# Patient Record
Sex: MALE | Race: WHITE | NOT HISPANIC OR LATINO | Employment: FULL TIME | URBAN - NONMETROPOLITAN AREA
[De-identification: names, ages, dates, MRNs, and addresses within clinical notes are randomized per-mention and may not be internally consistent; named-entity substitution may affect disease eponyms.]

---

## 2017-12-26 ENCOUNTER — OFFICE VISIT (OUTPATIENT)
Dept: FAMILY MEDICINE CLINIC | Facility: CLINIC | Age: 37
End: 2017-12-26

## 2017-12-26 ENCOUNTER — TELEPHONE (OUTPATIENT)
Dept: FAMILY MEDICINE CLINIC | Facility: CLINIC | Age: 37
End: 2017-12-26

## 2017-12-26 VITALS
BODY MASS INDEX: 20.92 KG/M2 | TEMPERATURE: 97.7 F | WEIGHT: 138 LBS | DIASTOLIC BLOOD PRESSURE: 68 MMHG | HEART RATE: 53 BPM | SYSTOLIC BLOOD PRESSURE: 120 MMHG | OXYGEN SATURATION: 99 % | HEIGHT: 68 IN

## 2017-12-26 DIAGNOSIS — H61.22 IMPACTED CERUMEN OF LEFT EAR: ICD-10-CM

## 2017-12-26 DIAGNOSIS — J02.9 ACUTE PHARYNGITIS, UNSPECIFIED ETIOLOGY: Primary | ICD-10-CM

## 2017-12-26 PROCEDURE — 96372 THER/PROPH/DIAG INJ SC/IM: CPT | Performed by: NURSE PRACTITIONER

## 2017-12-26 PROCEDURE — 99213 OFFICE O/P EST LOW 20 MIN: CPT | Performed by: NURSE PRACTITIONER

## 2017-12-26 PROCEDURE — 69210 REMOVE IMPACTED EAR WAX UNI: CPT | Performed by: NURSE PRACTITIONER

## 2017-12-26 RX ORDER — AZITHROMYCIN 250 MG/1
TABLET, FILM COATED ORAL
Qty: 6 TABLET | Refills: 0 | Status: SHIPPED | OUTPATIENT
Start: 2017-12-26 | End: 2017-12-26 | Stop reason: SDUPTHER

## 2017-12-26 RX ORDER — AZITHROMYCIN 250 MG/1
TABLET, FILM COATED ORAL
Qty: 6 TABLET | Refills: 0 | Status: SHIPPED | OUTPATIENT
Start: 2017-12-26 | End: 2018-01-02

## 2017-12-26 RX ORDER — METHYLPREDNISOLONE ACETATE 80 MG/ML
40 INJECTION, SUSPENSION INTRA-ARTICULAR; INTRALESIONAL; INTRAMUSCULAR; SOFT TISSUE ONCE
Status: COMPLETED | OUTPATIENT
Start: 2017-12-26 | End: 2017-12-26

## 2017-12-26 RX ORDER — ASPIRIN 81 MG/1
81 TABLET, CHEWABLE ORAL DAILY
COMMUNITY

## 2017-12-26 RX ADMIN — METHYLPREDNISOLONE ACETATE 40 MG: 80 INJECTION, SUSPENSION INTRA-ARTICULAR; INTRALESIONAL; INTRAMUSCULAR; SOFT TISSUE at 14:22

## 2017-12-26 NOTE — TELEPHONE ENCOUNTER
Patient wife requesting his medicines be resent to [facility]. Pharmacy they normally use is closed until after the first of the year.

## 2017-12-26 NOTE — PROGRESS NOTES
Subjective   Alepsh Small is a 37 y.o. male who presents to the office complaining of scratchy throat and ear pain, Left worse than Right that started last night.  Has occasional allergies but says that he usually does not take medications for them.  Denies chills.  Takes Metoprolol for SVT episodes in the past.    History of Present Illness     The following portions of the patient's history were reviewed and updated as appropriate: allergies, current medications, past family history, past medical history, past social history, past surgical history and problem list.    Review of Systems   Constitutional: Negative for chills, fatigue and fever.   HENT: Positive for sore throat. Negative for congestion, sneezing and trouble swallowing.    Eyes: Negative for visual disturbance.   Respiratory: Negative for cough, chest tightness, shortness of breath and wheezing.    Cardiovascular: Negative for chest pain, palpitations and leg swelling.   Gastrointestinal: Negative for abdominal pain, constipation, diarrhea, nausea and vomiting.   Genitourinary: Negative for dysuria, frequency and urgency.   Musculoskeletal: Negative for neck pain.   Skin: Negative for rash.   Neurological: Negative for dizziness, weakness and headaches.   Psychiatric/Behavioral:        In the past two weeks the pt has not felt down, depressed, hopeless or lost interest in doing things   All other systems reviewed and are negative.      Objective   Physical Exam   Constitutional: He is oriented to person, place, and time. He appears well-developed and well-nourished. He is cooperative.  Non-toxic appearance. He does not have a sickly appearance. He does not appear ill.   HENT:   Head: Normocephalic and atraumatic.   Right Ear: Tympanic membrane and external ear normal.   Left Ear: Tympanic membrane and external ear normal.   Nose: Nose normal.   Mouth/Throat: Uvula is midline and mucous membranes are normal. Posterior oropharyngeal erythema  present.   Left TM required instrumentation lavage due to impaction; enough cerumen removed to see nml TM   Eyes: Conjunctivae, EOM and lids are normal. Pupils are equal, round, and reactive to light. Right eye exhibits no discharge. Left eye exhibits no discharge. No scleral icterus.   Neck: Trachea normal, normal range of motion and phonation normal. Neck supple. No thyromegaly present.   Cardiovascular: Normal rate, regular rhythm, normal heart sounds and intact distal pulses.  Exam reveals no gallop and no friction rub.    No murmur heard.  Pulmonary/Chest: Effort normal and breath sounds normal. No respiratory distress. He has no wheezes. He has no rales.   Abdominal: Soft. Normal appearance and bowel sounds are normal. He exhibits no distension. There is no tenderness. There is no rebound and no guarding.   Musculoskeletal: Normal range of motion. He exhibits no edema.   Lymphadenopathy:     He has no cervical adenopathy.   Neurological: He is alert and oriented to person, place, and time. No cranial nerve deficit. GCS eye subscore is 4. GCS verbal subscore is 5. GCS motor subscore is 6.   Skin: Skin is warm, dry and intact. No rash noted.   Psychiatric: He has a normal mood and affect. His behavior is normal. Judgment and thought content normal.   Nursing note and vitals reviewed.      Assessment/Plan     Alpesh was seen today for sore throat.    Diagnoses and all orders for this visit:    Acute pharyngitis, unspecified etiology  -     methylPREDNISolone acetate (DEPO-medrol) injection 40 mg; Inject 0.5 mL into the shoulder, thigh, or buttocks 1 (One) Time.    Impacted cerumen of left ear    Other orders  -     Discontinue: azithromycin (ZITHROMAX) 250 MG tablet; Take 2 tablets the first day, then 1 tablet daily for 4 days.    Encouraged warm salt water gargles, fluid maintenance and good handwashing.

## 2018-01-02 ENCOUNTER — OFFICE VISIT (OUTPATIENT)
Dept: FAMILY MEDICINE CLINIC | Facility: CLINIC | Age: 38
End: 2018-01-02

## 2018-01-02 VITALS
HEIGHT: 68 IN | WEIGHT: 137 LBS | HEART RATE: 73 BPM | SYSTOLIC BLOOD PRESSURE: 110 MMHG | TEMPERATURE: 99.7 F | DIASTOLIC BLOOD PRESSURE: 68 MMHG | OXYGEN SATURATION: 98 % | BODY MASS INDEX: 20.76 KG/M2

## 2018-01-02 DIAGNOSIS — R53.83 FATIGUE, UNSPECIFIED TYPE: ICD-10-CM

## 2018-01-02 DIAGNOSIS — R50.9 FEVER, UNSPECIFIED FEVER CAUSE: Primary | ICD-10-CM

## 2018-01-02 DIAGNOSIS — R52 BODY ACHES: ICD-10-CM

## 2018-01-02 DIAGNOSIS — R05.9 COUGH: ICD-10-CM

## 2018-01-02 LAB
ALBUMIN SERPL-MCNC: 4.6 G/DL (ref 3.2–5.5)
ALBUMIN/GLOB SERPL: 1.4 G/DL (ref 1–3)
ALP SERPL-CCNC: 132 U/L (ref 15–121)
ALT SERPL W P-5'-P-CCNC: 17 U/L (ref 10–60)
ANION GAP SERPL CALCULATED.3IONS-SCNC: 11 MMOL/L (ref 5–15)
AST SERPL-CCNC: 20 U/L (ref 10–60)
BASOPHILS # BLD AUTO: 0.04 10*3/MM3 (ref 0–0.2)
BASOPHILS NFR BLD AUTO: 0.5 % (ref 0–2)
BILIRUB SERPL-MCNC: 0.6 MG/DL (ref 0.2–1)
BILIRUB UR QL STRIP: NEGATIVE
BUN BLD-MCNC: 17 MG/DL (ref 8–25)
BUN/CREAT SERPL: 17 (ref 7–25)
CALCIUM SPEC-SCNC: 8.8 MG/DL (ref 8.4–10.8)
CHLORIDE SERPL-SCNC: 100 MMOL/L (ref 100–112)
CLARITY UR: CLEAR
CO2 SERPL-SCNC: 29 MMOL/L (ref 20–32)
COLOR UR: YELLOW
CREAT BLD-MCNC: 1 MG/DL (ref 0.4–1.3)
DEPRECATED RDW RBC AUTO: 45 FL (ref 35.1–43.9)
EOSINOPHIL # BLD AUTO: 0.14 10*3/MM3 (ref 0–0.7)
EOSINOPHIL NFR BLD AUTO: 1.9 % (ref 0–7)
ERYTHROCYTE [DISTWIDTH] IN BLOOD BY AUTOMATED COUNT: 14.1 % (ref 11.5–14.5)
GFR SERPL CREATININE-BSD FRML MDRD: 84 ML/MIN/1.73 (ref 70–162)
GLOBULIN UR ELPH-MCNC: 3.3 GM/DL (ref 2.5–4.6)
GLUCOSE BLD-MCNC: 108 MG/DL (ref 70–100)
GLUCOSE UR STRIP-MCNC: NEGATIVE MG/DL
HCT VFR BLD AUTO: 47.8 % (ref 39–49)
HGB BLD-MCNC: 15.9 G/DL (ref 13.7–17.3)
HGB UR QL STRIP.AUTO: NEGATIVE
KETONES UR QL STRIP: NEGATIVE
LEUKOCYTE ESTERASE UR QL STRIP.AUTO: NEGATIVE
LYMPHOCYTES # BLD AUTO: 0.8 10*3/MM3 (ref 0.6–4.2)
LYMPHOCYTES NFR BLD AUTO: 10.6 % (ref 10–50)
MCH RBC QN AUTO: 29.4 PG (ref 26.5–34)
MCHC RBC AUTO-ENTMCNC: 33.3 G/DL (ref 31.5–36.3)
MCV RBC AUTO: 88.4 FL (ref 80–98)
MONOCYTES # BLD AUTO: 1.12 10*3/MM3 (ref 0–0.9)
MONOCYTES NFR BLD AUTO: 14.8 % (ref 0–12)
NEUTROPHILS # BLD AUTO: 5.46 10*3/MM3 (ref 2–8.6)
NEUTROPHILS NFR BLD AUTO: 72.2 % (ref 37–80)
NITRITE UR QL STRIP: NEGATIVE
PH UR STRIP.AUTO: 6 [PH] (ref 5.5–8)
PLATELET # BLD AUTO: 236 10*3/MM3 (ref 150–450)
PMV BLD AUTO: 9.7 FL (ref 8–12)
POTASSIUM BLD-SCNC: 3.9 MMOL/L (ref 3.4–5.4)
PROT SERPL-MCNC: 7.9 G/DL (ref 6.7–8.2)
PROT UR QL STRIP: NEGATIVE
RBC # BLD AUTO: 5.41 10*6/MM3 (ref 4.37–5.74)
SODIUM BLD-SCNC: 140 MMOL/L (ref 134–146)
SP GR UR STRIP: 1.02 (ref 1–1.03)
UROBILINOGEN UR QL STRIP: NORMAL
WBC NRBC COR # BLD: 7.56 10*3/MM3 (ref 3.2–9.8)

## 2018-01-02 PROCEDURE — 80053 COMPREHEN METABOLIC PANEL: CPT | Performed by: NURSE PRACTITIONER

## 2018-01-02 PROCEDURE — 84443 ASSAY THYROID STIM HORMONE: CPT | Performed by: NURSE PRACTITIONER

## 2018-01-02 PROCEDURE — 36415 COLL VENOUS BLD VENIPUNCTURE: CPT | Performed by: NURSE PRACTITIONER

## 2018-01-02 PROCEDURE — 99214 OFFICE O/P EST MOD 30 MIN: CPT | Performed by: NURSE PRACTITIONER

## 2018-01-02 PROCEDURE — 86618 LYME DISEASE ANTIBODY: CPT | Performed by: NURSE PRACTITIONER

## 2018-01-02 PROCEDURE — 84439 ASSAY OF FREE THYROXINE: CPT | Performed by: NURSE PRACTITIONER

## 2018-01-02 PROCEDURE — 82607 VITAMIN B-12: CPT | Performed by: NURSE PRACTITIONER

## 2018-01-02 PROCEDURE — 81003 URINALYSIS AUTO W/O SCOPE: CPT | Performed by: NURSE PRACTITIONER

## 2018-01-02 PROCEDURE — 86666 EHRLICHIA ANTIBODY: CPT | Performed by: NURSE PRACTITIONER

## 2018-01-02 PROCEDURE — 85025 COMPLETE CBC W/AUTO DIFF WBC: CPT | Performed by: NURSE PRACTITIONER

## 2018-01-03 LAB
T4 FREE SERPL-MCNC: 0.76 NG/DL (ref 0.78–2.19)
TSH SERPL DL<=0.05 MIU/L-ACNC: 0.85 MIU/ML (ref 0.46–4.68)
VIT B12 BLD-MCNC: 519 PG/ML (ref 239–931)

## 2018-01-03 NOTE — PROGRESS NOTES
"Chief Complaint   Patient presents with   • Generalized Body Aches     x 2 months  (off and on)   • Cough     x 2 days, unproductive    • feel sluggish       Subjective   Alpesh Small is a 37 y.o. male presents to the office for evaluation of generalized body aches x 3 months and cough X 2 days.    [facility]  Myocardial Infarction- 12/2016    SVT- managed with metoprolol     HPI   Patient presents with c/o generalized body aches X 3 months. He states \"my bones hurt\".  He has taken 800 mg IBU with good relief for about 4-6 hours, but as soon as the IBU wears off, his pain returns. He has had a low grade fever for an unknown amount of time. He denies N/V/D. Associated symptoms include weakness and fatigue.     No known history of tick bite. No new medications. No know sick contacts      Fatigue   This is a new problem. The current episode started more than 1 month ago. The problem occurs constantly. Associated symptoms include arthralgias, fatigue, a fever, headaches, myalgias and vertigo. Pertinent negatives include no abdominal pain, chest pain, chills, congestion, coughing, nausea, numbness, rash, sore throat, vomiting or weakness. Nothing aggravates the symptoms. He has tried rest for the symptoms. The treatment provided no relief.     Family History   Problem Relation Age of Onset   • Cancer Father      Social History     Social History   • Marital status: Single     Spouse name: N/A   • Number of children: N/A   • Years of education: N/A     Occupational History   • Not on file.     Social History Main Topics   • Smoking status: Current Every Day Smoker     Types: Electronic Cigarette   • Smokeless tobacco: Never Used   • Alcohol use No      Comment: occasionally   • Drug use: No   • Sexual activity: Defer     Other Topics Concern   • Not on file     Social History Narrative   • No narrative on file       The following portions of the patient's history were reviewed and updated as appropriate: allergies, current " "medications, past family history, past medical history, past social history, past surgical history and problem list.    Review of Systems   Constitutional: Positive for fatigue and fever. Negative for activity change, appetite change, chills and unexpected weight change.   HENT: Negative for congestion, drooling, facial swelling, postnasal drip, rhinorrhea, sinus pressure, sore throat, trouble swallowing and voice change.    Eyes: Negative.  Negative for photophobia, pain, discharge and visual disturbance.   Respiratory: Negative.  Negative for apnea, cough, choking and wheezing.    Cardiovascular: Negative.  Negative for chest pain, palpitations and leg swelling.   Gastrointestinal: Negative.  Negative for abdominal distention, abdominal pain, constipation, diarrhea, nausea and vomiting.   Endocrine: Negative.  Negative for polyphagia and polyuria.   Genitourinary: Negative.  Negative for decreased urine volume, difficulty urinating and dysuria.   Musculoskeletal: Positive for arthralgias and myalgias. Negative for gait problem.   Skin: Negative.  Negative for rash and wound.   Allergic/Immunologic: Negative.    Neurological: Positive for vertigo and headaches. Negative for dizziness, syncope, weakness, light-headedness and numbness.   Hematological: Negative.    Psychiatric/Behavioral: Negative.  Negative for confusion, decreased concentration and sleep disturbance. The patient is not nervous/anxious.      14 Point ROS completed with pertinent positives discussed. All other systems reviewed and are negative.       Objective   Encounter Vitals  /68  Pulse 73  Temp 99.7 °F (37.6 °C) (Tympanic)   Ht 172.7 cm (68\")  Wt 62.1 kg (137 lb)  SpO2 98%  BMI 20.83 kg/m2    Physical Exam   Constitutional: He is oriented to person, place, and time. Vital signs are normal. He appears well-developed and well-nourished.   HENT:   Head: Normocephalic and atraumatic.   Right Ear: Tympanic membrane, external ear and ear " canal normal.   Left Ear: Tympanic membrane, external ear and ear canal normal.   Nose: Nose normal.   Mouth/Throat: Uvula is midline, oropharynx is clear and moist and mucous membranes are normal. No posterior oropharyngeal edema or posterior oropharyngeal erythema.   Eyes: Lids are normal. Pupils are equal, round, and reactive to light.   Neck: Trachea normal and normal range of motion. Neck supple. No thyroid mass present.   Cardiovascular: Normal rate, regular rhythm, S1 normal, S2 normal, normal heart sounds and intact distal pulses.  Exam reveals no gallop and no friction rub.    No murmur heard.  Pulmonary/Chest: Effort normal and breath sounds normal. No respiratory distress. He has no wheezes. He has no rales.   Abdominal: Soft. Normal appearance and bowel sounds are normal. He exhibits no mass. There is no rebound and no guarding.   Musculoskeletal: Normal range of motion.   Generalized body aches   Lymphadenopathy:     He has no cervical adenopathy.   Neurological: He is alert and oriented to person, place, and time. He has normal strength. No cranial nerve deficit or sensory deficit. Gait normal.   Skin: Skin is warm and dry. No rash noted.   Psychiatric: He has a normal mood and affect. His speech is normal and behavior is normal. Judgment and thought content normal. Cognition and memory are normal.   Nursing note and vitals reviewed.      Pertinent Labs  Office Visit on 01/02/2018   Component Date Value Ref Range Status   • Glucose 01/02/2018 108* 70 - 100 mg/dL Final   • BUN 01/02/2018 17  8 - 25 mg/dL Final   • Creatinine 01/02/2018 1.00  0.40 - 1.30 mg/dL Final   • Sodium 01/02/2018 140  134 - 146 mmol/L Final   • Potassium 01/02/2018 3.9  3.4 - 5.4 mmol/L Final   • Chloride 01/02/2018 100  100 - 112 mmol/L Final   • CO2 01/02/2018 29.0  20.0 - 32.0 mmol/L Final   • Calcium 01/02/2018 8.8  8.4 - 10.8 mg/dL Final   • Total Protein 01/02/2018 7.9  6.7 - 8.2 g/dL Final   • Albumin 01/02/2018 4.60  3.20  - 5.50 g/dL Final   • ALT (SGPT) 01/02/2018 17  10 - 60 U/L Final   • AST (SGOT) 01/02/2018 20  10 - 60 U/L Final   • Alkaline Phosphatase 01/02/2018 132* 15 - 121 U/L Final   • Total Bilirubin 01/02/2018 0.6  0.2 - 1.0 mg/dL Final   • eGFR Non African Amer 01/02/2018 84  70 - 162 mL/min/1.73 Final   • Globulin 01/02/2018 3.3  2.5 - 4.6 gm/dL Final   • A/G Ratio 01/02/2018 1.4  1.0 - 3.0 g/dL Final   • BUN/Creatinine Ratio 01/02/2018 17.0  7.0 - 25.0 Final   • Anion Gap 01/02/2018 11.0  5.0 - 15.0 mmol/L Final   • TSH 01/02/2018 0.850  0.460 - 4.680 mIU/mL Final   • Free T4 01/02/2018 0.76* 0.78 - 2.19 ng/dL Final   • Color, UA 01/02/2018 Yellow  Yellow, Straw Final   • Appearance, UA 01/02/2018 Clear  Clear Final   • pH, UA 01/02/2018 6.0  5.5 - 8.0 Final   • Specific Gravity, UA 01/02/2018 1.020  1.005 - 1.030 Final   • Glucose, UA 01/02/2018 Negative  Negative Final   • Ketones, UA 01/02/2018 Negative  Negative Final   • Bilirubin, UA 01/02/2018 Negative  Negative Final   • Blood, UA 01/02/2018 Negative  Negative Final   • Protein, UA 01/02/2018 Negative  Negative Final   • Leuk Esterase, UA 01/02/2018 Negative  Negative Final   • Nitrite, UA 01/02/2018 Negative  Negative Final   • Urobilinogen, UA 01/02/2018 0.2 E.U./dL  0.2 - 1.0 E.U./dL Final   • Vitamin B-12 01/02/2018 519  239 - 931 pg/mL Final   • WBC 01/02/2018 7.56  3.20 - 9.80 10*3/mm3 Final   • RBC 01/02/2018 5.41  4.37 - 5.74 10*6/mm3 Final   • Hemoglobin 01/02/2018 15.9  13.7 - 17.3 g/dL Final   • Hematocrit 01/02/2018 47.8  39.0 - 49.0 % Final   • MCV 01/02/2018 88.4  80.0 - 98.0 fL Final   • MCH 01/02/2018 29.4  26.5 - 34.0 pg Final   • MCHC 01/02/2018 33.3  31.5 - 36.3 g/dL Final   • RDW 01/02/2018 14.1  11.5 - 14.5 % Final   • RDW-SD 01/02/2018 45.0* 35.1 - 43.9 fl Final   • MPV 01/02/2018 9.7  8.0 - 12.0 fL Final   • Platelets 01/02/2018 236  150 - 450 10*3/mm3 Final   • Neutrophil % 01/02/2018 72.2  37.0 - 80.0 % Final   • Lymphocyte %  01/02/2018 10.6  10.0 - 50.0 % Final   • Monocyte % 01/02/2018 14.8* 0.0 - 12.0 % Final   • Eosinophil % 01/02/2018 1.9  0.0 - 7.0 % Final   • Basophil % 01/02/2018 0.5  0.0 - 2.0 % Final   • Neutrophils, Absolute 01/02/2018 5.46  2.00 - 8.60 10*3/mm3 Final   • Lymphocytes, Absolute 01/02/2018 0.80  0.60 - 4.20 10*3/mm3 Final   • Monocytes, Absolute 01/02/2018 1.12* 0.00 - 0.90 10*3/mm3 Final   • Eosinophils, Absolute 01/02/2018 0.14  0.00 - 0.70 10*3/mm3 Final   • Basophils, Absolute 01/02/2018 0.04  0.00 - 0.20 10*3/mm3 Final     Labs have been independently reviewed    Key Imaging/Tracings/POC Testing  CXR   No acute cardiopulmonary disease noted     Assessment and Medications  Problems Addressed this Visit     None      Visit Diagnoses     Fever, unspecified fever cause    -  Primary    Relevant Orders    CBC & Differential (Completed)    Comprehensive Metabolic Panel (Completed)    TSH (Completed)    T4, Free (Completed)    Urinalysis With / Culture If Indicated - Urine, Clean Catch (Completed)    Vitamin B12 (Completed)    Ehrlichia Antibody Panel    Lyme, Total Antibody Test / Reflex    [place] SF (IgG / M)    CBC Auto Differential (Completed)    Fatigue, unspecified type        Body aches        Relevant Orders    CBC & Differential (Completed)    Vitamin B12 (Completed)    Ehrlichia Antibody Panel    Lyme, Total Antibody Test / Reflex    [place] SF (IgG / M)    CBC Auto Differential (Completed)    Cough        Relevant Orders    XR Chest 2 View (Completed)        Side effects of ordered medications discussed with patient.     Plan/Additional Notes/Instructions  Plan   1. Suspected tick bite sequela  2. Continue PRN IBU as directed  3. Rest  4. Increase PO water intake  5. Will call with results of above ordered studies and advance/change POC as needed after review    Follow-Up  Return if symptoms worsen or fail to improve, for After ordered studies are completed.    Patient/caregiver  verbalizes understanding of all orders and instructions in this plan of care.           This document has been electronically signed by VICTORIA Yao on January 3, 2018 1:20 PM

## 2018-01-04 LAB — B BURGDOR IGG+IGM SER-ACNC: <0.91 ISR (ref 0–0.9)

## 2018-01-05 LAB
A PHAGOCYTOPH IGM TITR SER IF: NEGATIVE {TITER}
CONV HGE IGG TITER: NEGATIVE
E CHAFFEENSIS IGG TITR SER IF: NEGATIVE {TITER}
E. CHAFFEENSIS (HME) IGM TITER: NEGATIVE
R RICKETTSI IGG SER QL IA: ABNORMAL
R RICKETTSI IGG SER QL IA: POSITIVE
R RICKETTSI IGM TITR SER: 0.15 INDEX (ref 0–0.89)

## 2018-01-08 ENCOUNTER — OFFICE VISIT (OUTPATIENT)
Dept: FAMILY MEDICINE CLINIC | Facility: CLINIC | Age: 38
End: 2018-01-08

## 2018-01-08 VITALS
HEART RATE: 63 BPM | OXYGEN SATURATION: 99 % | BODY MASS INDEX: 20.63 KG/M2 | HEIGHT: 68 IN | TEMPERATURE: 97.6 F | SYSTOLIC BLOOD PRESSURE: 118 MMHG | WEIGHT: 136.13 LBS | DIASTOLIC BLOOD PRESSURE: 78 MMHG

## 2018-01-08 DIAGNOSIS — R42 DIZZINESS: Primary | ICD-10-CM

## 2018-01-08 DIAGNOSIS — H60.501 ACUTE OTITIS EXTERNA OF RIGHT EAR, UNSPECIFIED TYPE: ICD-10-CM

## 2018-01-08 DIAGNOSIS — R11.2 NAUSEA AND VOMITING, INTRACTABILITY OF VOMITING NOT SPECIFIED, UNSPECIFIED VOMITING TYPE: ICD-10-CM

## 2018-01-08 DIAGNOSIS — R05.9 COUGH: ICD-10-CM

## 2018-01-08 PROCEDURE — 99214 OFFICE O/P EST MOD 30 MIN: CPT | Performed by: NURSE PRACTITIONER

## 2018-01-08 RX ORDER — HYDROCODONE BITARTRATE AND ACETAMINOPHEN 10; 325 MG/1; MG/1
TABLET ORAL
Refills: 0 | COMMUNITY
Start: 2018-01-03 | End: 2018-08-24

## 2018-01-08 RX ORDER — PROMETHAZINE HYDROCHLORIDE 25 MG/1
25 TABLET ORAL EVERY 6 HOURS PRN
Qty: 30 TABLET | Refills: 0 | Status: SHIPPED | OUTPATIENT
Start: 2018-01-08 | End: 2019-08-09

## 2018-01-08 RX ORDER — GENTAMICIN SULFATE 3 MG/ML
SOLUTION/ DROPS OPHTHALMIC
Qty: 5 ML | Refills: 0 | Status: SHIPPED | OUTPATIENT
Start: 2018-01-08 | End: 2018-01-17 | Stop reason: SDUPTHER

## 2018-01-08 RX ORDER — MECLIZINE HYDROCHLORIDE 25 MG/1
25 TABLET ORAL 3 TIMES DAILY PRN
Qty: 30 TABLET | Refills: 0 | Status: SHIPPED | OUTPATIENT
Start: 2018-01-08 | End: 2018-01-18

## 2018-01-08 RX ORDER — METHYLPREDNISOLONE 4 MG/1
TABLET ORAL
Qty: 21 TABLET | Refills: 0 | Status: SHIPPED | OUTPATIENT
Start: 2018-01-08 | End: 2018-01-17

## 2018-01-08 NOTE — PROGRESS NOTES
"Chief Complaint   Patient presents with   • Dizziness     x3days has been bad   • Vomiting     of and on   • Nausea   • Headache     says he has pressure in front of head       Subjective   Alpesh Small is a 37 y.o. male presents to the office for evaluation of  Dizziness, N/V/D, and headache X 1 week. He is accompanied by his wife who provides additional history.     [facility]  Myocardial Infarction- 12/2016    SVT- managed with metoprolol     HPI   Patient presents with continued c/o dizziness. He states he feels like both he and the room is spinning. He states he feels like he has \"motion sickness\".  Dizziness is not improved by concentrating on a fixed object or by lying down and closing his eyes. Associated symptoms include  N/V and headache. No change In vision, no syncope.       He continues to have a dry cough, although it has improved since our last office visit.   He also c/o flushing to neck and chest, which is associated with \"hot flashes\".     Last fever was last night- was not measured, but was sweating through his clothes. He took tylenol with good relief of fever.     Body aches have improved.   appetite is still decreased, but he has eaten some. Last meal was this morning, sausage/cheese biscuit. No emesis.     He continues to drink well and voids multiple times/day.    Neg CXR 1/2/2018.    Dizziness   This is a new problem. The current episode started 1 to 4 weeks ago. The problem occurs constantly. The problem has been unchanged. Associated symptoms include headaches, nausea, vomiting and weakness. Pertinent negatives include no abdominal pain, arthralgias, chest pain, chills, congestion, coughing, fatigue, fever, myalgias, numbness, rash or sore throat. Exacerbated by: moving. He has tried rest for the symptoms. The treatment provided no relief.   Vomiting    This is a new problem. The current episode started 1 to 4 weeks ago. The problem occurs 2 to 4 times per day. The emesis has an appearance " of bile. Maximum temperature: suspected fever last night- did no recored. Associated symptoms include dizziness and headaches. Pertinent negatives include no abdominal pain, arthralgias, chest pain, chills, coughing, diarrhea, fever or myalgias. He has tried increased fluids for the symptoms. The treatment provided no relief.     Family History   Problem Relation Age of Onset   • Cancer Father      Social History     Social History   • Marital status: Single     Spouse name: N/A   • Number of children: N/A   • Years of education: N/A     Occupational History   • Not on file.     Social History Main Topics   • Smoking status: Current Every Day Smoker     Types: Electronic Cigarette   • Smokeless tobacco: Never Used   • Alcohol use No      Comment: occasionally   • Drug use: No   • Sexual activity: Defer     Other Topics Concern   • Not on file     Social History Narrative   • No narrative on file       The following portions of the patient's history were reviewed and updated as appropriate: allergies, current medications, past family history, past medical history, past social history, past surgical history and problem list.    Review of Systems   Constitutional: Negative for activity change, appetite change, chills, fatigue, fever and unexpected weight change.   HENT: Negative for congestion, drooling, facial swelling, postnasal drip, rhinorrhea, sinus pressure, sore throat, trouble swallowing and voice change.    Eyes: Negative.  Negative for photophobia, pain, discharge and visual disturbance.   Respiratory: Negative.  Negative for apnea, cough, choking and wheezing.    Cardiovascular: Negative.  Negative for chest pain, palpitations and leg swelling.   Gastrointestinal: Positive for nausea and vomiting. Negative for abdominal distention, abdominal pain, constipation and diarrhea.   Endocrine: Negative.  Negative for polyphagia and polyuria.   Genitourinary: Negative.  Negative for decreased urine volume,  "difficulty urinating and dysuria.   Musculoskeletal: Negative.  Negative for arthralgias, gait problem and myalgias.   Skin: Negative.  Negative for rash and wound.   Allergic/Immunologic: Negative.    Neurological: Positive for dizziness, weakness and headaches. Negative for syncope, light-headedness and numbness.   Hematological: Negative.    Psychiatric/Behavioral: Negative.  Negative for confusion, decreased concentration and sleep disturbance. The patient is not nervous/anxious.      14 Point ROS completed with pertinent positives discussed. All other systems reviewed and are negative.       Objective   Encounter Vitals  /78  Pulse 63  Temp 97.6 °F (36.4 °C) (Tympanic)   Ht 172.7 cm (68\")  Wt 61.7 kg (136 lb 2 oz)  SpO2 99%  BMI 20.7 kg/m2  Vitals:    01/08/18 1003 01/08/18 1056 01/08/18 1057 01/08/18 1058   BP: 100/62 98/62 112/72 118/78   Pulse: 60 58 57 63   Temp: 97.6 °F (36.4 °C)      TempSrc: Tympanic      SpO2: 99%      Weight: 61.7 kg (136 lb 2 oz)      Height: 172.7 cm (68\")        Physical Exam   Constitutional: He is oriented to person, place, and time. Vital signs are normal. He appears well-developed and well-nourished. He has a sickly appearance.   HENT:   Head: Normocephalic and atraumatic.   Right Ear: Tympanic membrane, external ear and ear canal normal.   Left Ear: External ear and ear canal normal. Tympanic membrane is retracted.   Nose: Nose normal.   Mouth/Throat: Uvula is midline and mucous membranes are normal. Posterior oropharyngeal edema and posterior oropharyngeal erythema present. No tonsillar exudate.   Eyes: Lids are normal. Pupils are equal, round, and reactive to light.   Neck: Trachea normal and normal range of motion. Neck supple. No thyroid mass present.   Cardiovascular: Normal rate, regular rhythm, S1 normal, S2 normal, normal heart sounds and intact distal pulses.  Exam reveals no gallop and no friction rub.    No murmur heard.  Pulmonary/Chest: Effort normal. " No respiratory distress. He has no wheezes. He has rhonchi in the right lower field and the left lower field. He has no rales.   Mild rhonchi to bilateral lower lobes   Abdominal: Soft. Normal appearance and bowel sounds are normal. He exhibits no mass. There is no rebound and no guarding.   Musculoskeletal: Normal range of motion.   Lymphadenopathy:     He has no cervical adenopathy.   Neurological: He is alert and oriented to person, place, and time. He has normal strength. No cranial nerve deficit or sensory deficit. Gait normal.   Skin: Skin is warm and dry. No rash noted.   Psychiatric: He has a normal mood and affect. His speech is normal and behavior is normal. Judgment and thought content normal. Cognition and memory are normal.   Nursing note and vitals reviewed.      Pertinent Labs  Office Visit on 01/02/2018   Component Date Value Ref Range Status   • Glucose 01/02/2018 108* 70 - 100 mg/dL Final   • BUN 01/02/2018 17  8 - 25 mg/dL Final   • Creatinine 01/02/2018 1.00  0.40 - 1.30 mg/dL Final   • Sodium 01/02/2018 140  134 - 146 mmol/L Final   • Potassium 01/02/2018 3.9  3.4 - 5.4 mmol/L Final   • Chloride 01/02/2018 100  100 - 112 mmol/L Final   • CO2 01/02/2018 29.0  20.0 - 32.0 mmol/L Final   • Calcium 01/02/2018 8.8  8.4 - 10.8 mg/dL Final   • Total Protein 01/02/2018 7.9  6.7 - 8.2 g/dL Final   • Albumin 01/02/2018 4.60  3.20 - 5.50 g/dL Final   • ALT (SGPT) 01/02/2018 17  10 - 60 U/L Final   • AST (SGOT) 01/02/2018 20  10 - 60 U/L Final   • Alkaline Phosphatase 01/02/2018 132* 15 - 121 U/L Final   • Total Bilirubin 01/02/2018 0.6  0.2 - 1.0 mg/dL Final   • eGFR Non African Amer 01/02/2018 84  70 - 162 mL/min/1.73 Final   • Globulin 01/02/2018 3.3  2.5 - 4.6 gm/dL Final   • A/G Ratio 01/02/2018 1.4  1.0 - 3.0 g/dL Final   • BUN/Creatinine Ratio 01/02/2018 17.0  7.0 - 25.0 Final   • Anion Gap 01/02/2018 11.0  5.0 - 15.0 mmol/L Final   • TSH 01/02/2018 0.850  0.460 - 4.680 mIU/mL Final   • Free T4  01/02/2018 0.76* 0.78 - 2.19 ng/dL Final   • Color, UA 01/02/2018 Yellow  Yellow, Straw Final   • Appearance, UA 01/02/2018 Clear  Clear Final   • pH, UA 01/02/2018 6.0  5.5 - 8.0 Final   • Specific Gravity, UA 01/02/2018 1.020  1.005 - 1.030 Final   • Glucose, UA 01/02/2018 Negative  Negative Final   • Ketones, UA 01/02/2018 Negative  Negative Final   • Bilirubin, UA 01/02/2018 Negative  Negative Final   • Blood, UA 01/02/2018 Negative  Negative Final   • Protein, UA 01/02/2018 Negative  Negative Final   • Leuk Esterase, UA 01/02/2018 Negative  Negative Final   • Nitrite, UA 01/02/2018 Negative  Negative Final   • Urobilinogen, UA 01/02/2018 0.2 E.U./dL  0.2 - 1.0 E.U./dL Final   • Vitamin B-12 01/02/2018 519  239 - 931 pg/mL Final   • E. chaffeensis (HME) IgG Titer 01/02/2018 Negative  Neg:<1:64 Final   • E. chaffeensis (HME) IgM Titer 01/02/2018 Negative  Neg:<1:20 Final    IgG titers if 1:64 or greater indicate exposure or  acute and  convalescent samples showing a four-fold increase, and/or the presence  of IgM indicate recent or current infection.   • HGE IgG Titer 01/02/2018 Negative  Neg:<1:64 Final    HGE IgG levels are detectable 7 to 10 days post infection and persist  approximately one year.   • HGE IgM Titer 01/02/2018 Negative  Neg:<1:20 Final    Due to a reagent backorder, this test was performed using a different  assay. The reference interval for this alternate assay is:                                         Negative      <1:64                                         Positive       1:64 or greater  IgM levels usually rise 3 to 5 days post infection and fall to normal  levels in approximately 30 to 60 days.   • Lyme Ab IgG/IgM 01/02/2018 <0.91  0.00 - 0.90 ISR Final                                    Negative         <0.91                                  Equivocal  0.91 - 1.09                                  Positive         >1.09   • RMSF IgG 01/02/2018 Positive* Negative Final   • RMSF IgM  01/02/2018 0.15  0.00 - 0.89 index Final                                     Negative        <0.90                                   Equivocal 0.90 - 1.10                                   Positive        >1.10   • WBC 01/02/2018 7.56  3.20 - 9.80 10*3/mm3 Final   • RBC 01/02/2018 5.41  4.37 - 5.74 10*6/mm3 Final   • Hemoglobin 01/02/2018 15.9  13.7 - 17.3 g/dL Final   • Hematocrit 01/02/2018 47.8  39.0 - 49.0 % Final   • MCV 01/02/2018 88.4  80.0 - 98.0 fL Final   • MCH 01/02/2018 29.4  26.5 - 34.0 pg Final   • MCHC 01/02/2018 33.3  31.5 - 36.3 g/dL Final   • RDW 01/02/2018 14.1  11.5 - 14.5 % Final   • RDW-SD 01/02/2018 45.0* 35.1 - 43.9 fl Final   • MPV 01/02/2018 9.7  8.0 - 12.0 fL Final   • Platelets 01/02/2018 236  150 - 450 10*3/mm3 Final   • Neutrophil % 01/02/2018 72.2  37.0 - 80.0 % Final   • Lymphocyte % 01/02/2018 10.6  10.0 - 50.0 % Final   • Monocyte % 01/02/2018 14.8* 0.0 - 12.0 % Final   • Eosinophil % 01/02/2018 1.9  0.0 - 7.0 % Final   • Basophil % 01/02/2018 0.5  0.0 - 2.0 % Final   • Neutrophils, Absolute 01/02/2018 5.46  2.00 - 8.60 10*3/mm3 Final   • Lymphocytes, Absolute 01/02/2018 0.80  0.60 - 4.20 10*3/mm3 Final   • Monocytes, Absolute 01/02/2018 1.12* 0.00 - 0.90 10*3/mm3 Final   • Eosinophils, Absolute 01/02/2018 0.14  0.00 - 0.70 10*3/mm3 Final   • Basophils, Absolute 01/02/2018 0.04  0.00 - 0.20 10*3/mm3 Final   • RMSF IgG 01/02/2018 1:64* Neg <1:64 Final                                 Negative           <1:64                               Positive            1:64                               Recent/Active      >1:64  Titers of 1:64 are suggestive of past or possible  current infection. Titers >1:64 are suggestive of  recent or active infection. Approximately 9% of  specimens positive by EIA screen are negative by IFA.     Labs have been independently reviewed    Key Imaging/Tracings/POC Testing    Assessment and Medications  Problems Addressed this Visit     None      Visit Diagnoses      Dizziness    -  Primary    Relevant Medications    MethylPREDNISolone (MEDROL, ANATOLY,) 4 MG tablet    meclizine (ANTIVERT) 25 MG tablet    Nausea and vomiting, intractability of vomiting not specified, unspecified vomiting type        Relevant Medications    promethazine (PHENERGAN) 25 MG tablet    Acute otitis externa of right ear, unspecified type        Relevant Medications    gentamicin (GARAMYCIN) 0.3 % ophthalmic solution    Cough            Side effects of ordered medications discussed with patient.     Plan/Additional Notes/Instructions  Plan   1. 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 Start medrol dose pack and meclizine for dizziness  3. Gent gtts for right ear until symptoms clear  4. Phenergan for nausea- ok to take short term if meclizine does not work  5. If no improvement, will consider CT of sinuses  6. May consider starting doxy wed or thurs if symptoms do not improve  7. Instructed patient to take 1/2 tab metoprolol. Continue to monitor BP and HR  8. RTC if no better after completion of above ordered POC, or if symptoms worsen.    Follow-Up  Return if symptoms worsen or fail to improve.    Patient/caregiver verbalizes understanding of all orders and instructions in this plan of care.           This document has been electronically signed by VICTORIA Yao on January 8, 2018 11:53 AM

## 2018-01-09 ENCOUNTER — TELEPHONE (OUTPATIENT)
Dept: FAMILY MEDICINE CLINIC | Facility: CLINIC | Age: 38
End: 2018-01-09

## 2018-01-09 NOTE — TELEPHONE ENCOUNTER
Called pt to check on him after visit for dizziness. His  Wife answered the phone and stated he had been in bed since the visit yesterday. Feeling about the same.

## 2018-01-10 ENCOUNTER — TELEPHONE (OUTPATIENT)
Dept: FAMILY MEDICINE CLINIC | Facility: CLINIC | Age: 38
End: 2018-01-10

## 2018-01-10 NOTE — TELEPHONE ENCOUNTER
Called pt about the dizziness he was seen for on mon. His wife answered and told me that the dizziness has definitely improved and he's only experiencing it when standing. She said the meclizine is gradually working. I explained to her that he will experience some lingering symptoms until med is in system good and she agreed. I advised her to give the office a call if there were any problems or concerns.

## 2018-01-17 ENCOUNTER — OFFICE VISIT (OUTPATIENT)
Dept: FAMILY MEDICINE CLINIC | Facility: CLINIC | Age: 38
End: 2018-01-17

## 2018-01-17 VITALS
TEMPERATURE: 97.6 F | SYSTOLIC BLOOD PRESSURE: 114 MMHG | WEIGHT: 137.38 LBS | OXYGEN SATURATION: 97 % | BODY MASS INDEX: 20.82 KG/M2 | HEIGHT: 68 IN | DIASTOLIC BLOOD PRESSURE: 78 MMHG | HEART RATE: 68 BPM

## 2018-01-17 DIAGNOSIS — H60.501 ACUTE OTITIS EXTERNA OF RIGHT EAR, UNSPECIFIED TYPE: ICD-10-CM

## 2018-01-17 DIAGNOSIS — J03.90 TONSILLITIS: Primary | ICD-10-CM

## 2018-01-17 DIAGNOSIS — R42 DIZZINESS: ICD-10-CM

## 2018-01-17 DIAGNOSIS — I47.1 SVT (SUPRAVENTRICULAR TACHYCARDIA) (HCC): ICD-10-CM

## 2018-01-17 PROCEDURE — 99214 OFFICE O/P EST MOD 30 MIN: CPT | Performed by: NURSE PRACTITIONER

## 2018-01-17 RX ORDER — CLINDAMYCIN HYDROCHLORIDE 300 MG/1
300 CAPSULE ORAL 3 TIMES DAILY
Qty: 21 CAPSULE | Refills: 0 | Status: SHIPPED | OUTPATIENT
Start: 2018-01-17 | End: 2018-01-17 | Stop reason: SDUPTHER

## 2018-01-17 RX ORDER — GENTAMICIN SULFATE 3 MG/ML
SOLUTION/ DROPS OPHTHALMIC
Qty: 5 ML | Refills: 0 | Status: SHIPPED | OUTPATIENT
Start: 2018-01-17 | End: 2018-08-24

## 2018-01-17 RX ORDER — CLINDAMYCIN HYDROCHLORIDE 300 MG/1
300 CAPSULE ORAL 3 TIMES DAILY
Qty: 21 CAPSULE | Refills: 0 | Status: SHIPPED | OUTPATIENT
Start: 2018-01-17 | End: 2018-08-24

## 2018-01-17 NOTE — PROGRESS NOTES
"Chief Complaint   Patient presents with   • Dizziness   • Neck Pain   • Earache       Subjective   Alpesh Small is a 37 y.o. male presents to the office for re-evaluation of continued dizziness and new onset right neck pain.    PMH  Myocardial Infarction- 12/2016; not followed by cardiology    SVT- managed with metoprolol     HPI   Dizziness:  Patient presents with only mild improvement in dizziness. Last office visit was 01/08/2018. He has been taking meclizine 1-2x/day with mild improvement in dizziness. He states the room is no longer dizzy. He is only dizzy if he is up and moving around.   No nausea, chest pain, no emesis, no blurred vision. He does c/o continued headache to frontal sinus region. Headache pain is relieved with tylenol.     Neck pain:  Pain began 3 days ago. He describes the pain as \"sharp pain that is worse when I swallow\".  Pain is exacerbated by looking right. Tylenol does not relieve pain.     Dizziness   This is a recurrent problem. The current episode started 1 to 4 weeks ago. The problem occurs daily. The problem has been gradually improving. Associated symptoms include coughing, headaches, neck pain and a sore throat. Pertinent negatives include no abdominal pain, arthralgias, chest pain, chills, congestion, fatigue, fever, myalgias, nausea, numbness, rash, vertigo, visual change, vomiting or weakness. The symptoms are aggravated by walking and standing. Treatments tried: meclizine. The treatment provided mild relief.   Neck Pain    This is a new problem. The current episode started in the past 7 days. The problem occurs constantly. The problem has been unchanged. The pain is associated with nothing. The pain is present in the right side. Associated symptoms include headaches. Pertinent negatives include no chest pain, fever, numbness, paresis, photophobia, syncope, tingling, trouble swallowing, visual change or weakness. He has tried acetaminophen for the symptoms.     Family " History   Problem Relation Age of Onset   • Cancer Father      Social History     Social History   • Marital status: Single     Spouse name: N/A   • Number of children: N/A   • Years of education: N/A     Occupational History   • Not on file.     Social History Main Topics   • Smoking status: Current Every Day Smoker     Types: Electronic Cigarette   • Smokeless tobacco: Never Used   • Alcohol use No      Comment: occasionally   • Drug use: No   • Sexual activity: Defer     Other Topics Concern   • Not on file     Social History Narrative       The following portions of the patient's history were reviewed and updated as appropriate: allergies, current medications, past family history, past medical history, past social history, past surgical history and problem list.    Review of Systems   Constitutional: Negative for activity change, appetite change, chills, fatigue, fever and unexpected weight change.   HENT: Positive for sore throat. Negative for congestion, drooling, facial swelling, postnasal drip, rhinorrhea, sinus pressure, trouble swallowing and voice change.    Eyes: Negative.  Negative for photophobia, pain, discharge and visual disturbance.   Respiratory: Positive for cough. Negative for apnea, choking and wheezing.    Cardiovascular: Negative.  Negative for chest pain, palpitations, leg swelling and syncope.   Gastrointestinal: Negative.  Negative for abdominal distention, abdominal pain, constipation, diarrhea, nausea and vomiting.   Endocrine: Negative.  Negative for polyphagia and polyuria.   Genitourinary: Negative.  Negative for decreased urine volume, difficulty urinating and dysuria.   Musculoskeletal: Positive for neck pain. Negative for arthralgias, gait problem and myalgias.   Skin: Negative.  Negative for rash and wound.   Allergic/Immunologic: Negative.    Neurological: Positive for dizziness and headaches. Negative for vertigo, tingling, syncope, weakness, light-headedness and numbness.  "  Hematological: Negative.    Psychiatric/Behavioral: Negative.  Negative for confusion, decreased concentration and sleep disturbance. The patient is not nervous/anxious.      14 Point ROS completed with pertinent positives discussed. All other systems reviewed and are negative.       Objective   Encounter Vitals  /78  Pulse 68  Temp 97.6 °F (36.4 °C) (Tympanic)   Ht 172.7 cm (68\")  Wt 62.3 kg (137 lb 6 oz)  SpO2 97%  BMI 20.89 kg/m2  Vitals:    01/17/18 1300   BP: 114/78   Pulse: 68   Temp: 97.6 °F (36.4 °C)   TempSrc: Tympanic   SpO2: 97%   Weight: 62.3 kg (137 lb 6 oz)   Height: 172.7 cm (68\")       Physical Exam   Constitutional: He is oriented to person, place, and time. Vital signs are normal. He appears well-developed and well-nourished.   HENT:   Head: Normocephalic and atraumatic.   Right Ear: Tympanic membrane, external ear and ear canal normal. There is tenderness.   Left Ear: Tympanic membrane, external ear and ear canal normal. There is tenderness.   Nose: Nose normal.   Mouth/Throat: Uvula is midline and mucous membranes are normal. Posterior oropharyngeal edema and posterior oropharyngeal erythema present. No tonsillar abscesses. Tonsils are 3+ on the right. Tonsils are 1+ on the left. No tonsillar exudate.   Left cerumen blockage- can visualize past, no erythema or edema  Right external canal erythema, mild   Eyes: Lids are normal. Pupils are equal, round, and reactive to light. Right eye exhibits normal extraocular motion and no nystagmus. Left eye exhibits normal extraocular motion and no nystagmus.   Neck: Trachea normal. Neck supple. Decreased range of motion present. No thyroid mass present.   Right anterior neck tenderness   Cardiovascular: Normal rate, regular rhythm, S1 normal, S2 normal, normal heart sounds and intact distal pulses.  Exam reveals no gallop and no friction rub.    No murmur heard.  Pulmonary/Chest: Effort normal. No respiratory distress. He has no wheezes. He has " rhonchi in the right upper field, the right middle field, the right lower field, the left upper field and the left lower field. He has no rales.           + egophony to RUL   Abdominal: Soft. Normal appearance and bowel sounds are normal. He exhibits no mass. There is no rebound and no guarding.   Lymphadenopathy:        Head (right side): Tonsillar adenopathy present.     He has cervical adenopathy.        Right cervical: Superficial cervical and deep cervical adenopathy present.        Left cervical: Superficial cervical and deep cervical adenopathy present.   Neurological: He is alert and oriented to person, place, and time. He has normal strength. No cranial nerve deficit or sensory deficit. Gait normal. GCS eye subscore is 4. GCS verbal subscore is 5. GCS motor subscore is 6.   Skin: Skin is warm and dry. No rash noted.   Psychiatric: He has a normal mood and affect. His speech is normal and behavior is normal. Judgment and thought content normal. Cognition and memory are normal.   Nursing note and vitals reviewed.      Pertinent Labs  Office Visit on 01/02/2018   Component Date Value Ref Range Status   • Glucose 01/02/2018 108* 70 - 100 mg/dL Final   • BUN 01/02/2018 17  8 - 25 mg/dL Final   • Creatinine 01/02/2018 1.00  0.40 - 1.30 mg/dL Final   • Sodium 01/02/2018 140  134 - 146 mmol/L Final   • Potassium 01/02/2018 3.9  3.4 - 5.4 mmol/L Final   • Chloride 01/02/2018 100  100 - 112 mmol/L Final   • CO2 01/02/2018 29.0  20.0 - 32.0 mmol/L Final   • Calcium 01/02/2018 8.8  8.4 - 10.8 mg/dL Final   • Total Protein 01/02/2018 7.9  6.7 - 8.2 g/dL Final   • Albumin 01/02/2018 4.60  3.20 - 5.50 g/dL Final   • ALT (SGPT) 01/02/2018 17  10 - 60 U/L Final   • AST (SGOT) 01/02/2018 20  10 - 60 U/L Final   • Alkaline Phosphatase 01/02/2018 132* 15 - 121 U/L Final   • Total Bilirubin 01/02/2018 0.6  0.2 - 1.0 mg/dL Final   • eGFR Non African Amer 01/02/2018 84  70 - 162 mL/min/1.73 Final   • Globulin 01/02/2018 3.3  2.5  - 4.6 gm/dL Final   • A/G Ratio 01/02/2018 1.4  1.0 - 3.0 g/dL Final   • BUN/Creatinine Ratio 01/02/2018 17.0  7.0 - 25.0 Final   • Anion Gap 01/02/2018 11.0  5.0 - 15.0 mmol/L Final   • TSH 01/02/2018 0.850  0.460 - 4.680 mIU/mL Final   • Free T4 01/02/2018 0.76* 0.78 - 2.19 ng/dL Final   • Color, UA 01/02/2018 Yellow  Yellow, Straw Final   • Appearance, UA 01/02/2018 Clear  Clear Final   • pH, UA 01/02/2018 6.0  5.5 - 8.0 Final   • Specific Gravity, UA 01/02/2018 1.020  1.005 - 1.030 Final   • Glucose, UA 01/02/2018 Negative  Negative Final   • Ketones, UA 01/02/2018 Negative  Negative Final   • Bilirubin, UA 01/02/2018 Negative  Negative Final   • Blood, UA 01/02/2018 Negative  Negative Final   • Protein, UA 01/02/2018 Negative  Negative Final   • Leuk Esterase, UA 01/02/2018 Negative  Negative Final   • Nitrite, UA 01/02/2018 Negative  Negative Final   • Urobilinogen, UA 01/02/2018 0.2 E.U./dL  0.2 - 1.0 E.U./dL Final   • Vitamin B-12 01/02/2018 519  239 - 931 pg/mL Final   • E. chaffeensis (HME) IgG Titer 01/02/2018 Negative  Neg:<1:64 Final   • E. chaffeensis ([facility]ME) IgM Titer 01/02/2018 Negative  Neg:<1:20 Final    IgG titers if 1:64 or greater indicate exposure or  acute and  convalescent samples showing a four-fold increase, and/or the presence  of IgM indicate recent or current infection.   • HGE IgG Titer 01/02/2018 Negative  Neg:<1:64 Final    HGE IgG levels are detectable 7 to 10 days post infection and persist  approximately one year.   • HGE IgM Titer 01/02/2018 Negative  Neg:<1:20 Final    Due to a reagent backorder, this test was performed using a different  assay. The reference interval for this alternate assay is:                                         Negative      <1:64                                         Positive       1:64 or greater  IgM levels usually rise 3 to 5 days post infection and fall to normal  levels in approximately 30 to 60 days.   • Lyme Ab IgG/IgM 01/02/2018 <0.91  0.00 -  0.90 ISR Final                                    Negative         <0.91                                  Equivocal  0.91 - 1.09                                  Positive         >1.09   • RMSF IgG 01/02/2018 Positive* Negative Final   • RMSF IgM 01/02/2018 0.15  0.00 - 0.89 index Final                                     Negative        <0.90                                   Equivocal 0.90 - 1.10                                   Positive        >1.10   • WBC 01/02/2018 7.56  3.20 - 9.80 10*3/mm3 Final   • RBC 01/02/2018 5.41  4.37 - 5.74 10*6/mm3 Final   • Hemoglobin 01/02/2018 15.9  13.7 - 17.3 g/dL Final   • Hematocrit 01/02/2018 47.8  39.0 - 49.0 % Final   • MCV 01/02/2018 88.4  80.0 - 98.0 fL Final   • MCH 01/02/2018 29.4  26.5 - 34.0 pg Final   • MCHC 01/02/2018 33.3  31.5 - 36.3 g/dL Final   • RDW 01/02/2018 14.1  11.5 - 14.5 % Final   • RDW-SD 01/02/2018 45.0* 35.1 - 43.9 fl Final   • MPV 01/02/2018 9.7  8.0 - 12.0 fL Final   • Platelets 01/02/2018 236  150 - 450 10*3/mm3 Final   • Neutrophil % 01/02/2018 72.2  37.0 - 80.0 % Final   • Lymphocyte % 01/02/2018 10.6  10.0 - 50.0 % Final   • Monocyte % 01/02/2018 14.8* 0.0 - 12.0 % Final   • Eosinophil % 01/02/2018 1.9  0.0 - 7.0 % Final   • Basophil % 01/02/2018 0.5  0.0 - 2.0 % Final   • Neutrophils, Absolute 01/02/2018 5.46  2.00 - 8.60 10*3/mm3 Final   • Lymphocytes, Absolute 01/02/2018 0.80  0.60 - 4.20 10*3/mm3 Final   • Monocytes, Absolute 01/02/2018 1.12* 0.00 - 0.90 10*3/mm3 Final   • Eosinophils, Absolute 01/02/2018 0.14  0.00 - 0.70 10*3/mm3 Final   • Basophils, Absolute 01/02/2018 0.04  0.00 - 0.20 10*3/mm3 Final   • RMSF IgG 01/02/2018 1:64* Neg <1:64 Final                                 Negative           <1:64                               Positive            1:64                               Recent/Active      >1:64  Titers of 1:64 are suggestive of past or possible  current infection. Titers >1:64 are suggestive of  recent or active  infection. Approximately 9% of  specimens positive by EIA screen are negative by IFA.     Labs have been independently reviewed    Key Imaging/Tracings/POC Testing  Carotid u/s -  Negative- no stenosis    CT soft tissue neck-   Neg tonsillar abscess  Tonsillitis    CXR-  No acute cardiopulmonary disease      Assessment and Medications  Problems Addressed this Visit     None      Visit Diagnoses     Tonsillitis    -  Primary    Relevant Orders    XR Chest 2 View    CT Soft Tissue Neck With Contrast (Completed)    Dizziness        Relevant Orders    US Carotid Bilateral (Completed)    Ambulatory Referral to Cardiology    SVT (supraventricular tachycardia)        Relevant Orders    Ambulatory Referral to Cardiology    Acute otitis externa of right ear, unspecified type        Relevant Medications    gentamicin (GARAMYCIN) 0.3 % ophthalmic solution        Side effects of ordered medications discussed with patient.     Plan/Additional Notes/Instructions  Plan   1. Refer to cardiology given continued dizziness  2. Stat CT of soft tissue head/neck  3. Stat CXR- positive egophony to RUL  4. Start cleocin for tonsilitis given PCN allergy  5. Gent gtts to right ear for OE  6. Will consider MRI of brain if dizziness continues and brandon in lieu of clearance from cardiology    Follow-Up  Return if symptoms worsen or fail to improve, for After ordered studies are completed, After above ordered referral is complete.    Patient/caregiver verbalizes understanding of all orders and instructions in this plan of care.           This document has been electronically signed by VICTORIA Yao on January 17, 2018 4:26 PM

## 2018-08-24 ENCOUNTER — OFFICE VISIT (OUTPATIENT)
Dept: FAMILY MEDICINE CLINIC | Facility: CLINIC | Age: 38
End: 2018-08-24

## 2018-08-24 VITALS
OXYGEN SATURATION: 98 % | HEART RATE: 60 BPM | HEIGHT: 68 IN | WEIGHT: 138.8 LBS | SYSTOLIC BLOOD PRESSURE: 128 MMHG | TEMPERATURE: 97.5 F | BODY MASS INDEX: 21.04 KG/M2 | DIASTOLIC BLOOD PRESSURE: 70 MMHG

## 2018-08-24 DIAGNOSIS — R11.2 NAUSEA AND VOMITING, INTRACTABILITY OF VOMITING NOT SPECIFIED, UNSPECIFIED VOMITING TYPE: Primary | ICD-10-CM

## 2018-08-24 DIAGNOSIS — J02.9 ACUTE PHARYNGITIS, UNSPECIFIED ETIOLOGY: ICD-10-CM

## 2018-08-24 DIAGNOSIS — R52 BODY ACHES: ICD-10-CM

## 2018-08-24 PROCEDURE — 99214 OFFICE O/P EST MOD 30 MIN: CPT | Performed by: NURSE PRACTITIONER

## 2018-08-24 RX ORDER — BUSPIRONE HYDROCHLORIDE 5 MG/1
5 TABLET ORAL 3 TIMES DAILY
Qty: 60 TABLET | Refills: 1 | Status: SHIPPED | OUTPATIENT
Start: 2018-08-24 | End: 2019-06-07 | Stop reason: SDUPTHER

## 2018-09-07 PROBLEM — R11.2 NAUSEA AND VOMITING: Status: ACTIVE | Noted: 2018-09-07

## 2018-09-07 PROBLEM — R52 BODY ACHES: Status: ACTIVE | Noted: 2018-09-07

## 2018-09-08 NOTE — PROGRESS NOTES
Subjective   Alpesh Small is a 38 y.o. male. Patient presents today to establish care which will include management of anxiety and panic.  Has been taking one-half Metoprolol for this.  Sees cardio who diagnosed him with SVT.  PCP was Jm.    History of Present Illness     The following portions of the patient's history were reviewed and updated as appropriate: allergies, current medications, past family history, past medical history, past social history, past surgical history and problem list.    Review of Systems   Constitutional: Negative for chills, fatigue and fever.   HENT: Negative for congestion, sneezing, sore throat and trouble swallowing.    Eyes: Negative for visual disturbance.   Respiratory: Negative for cough, chest tightness, shortness of breath and wheezing.    Cardiovascular: Negative for chest pain, palpitations and leg swelling.   Gastrointestinal: Negative for abdominal pain, constipation, diarrhea, nausea and vomiting.   Genitourinary: Negative for dysuria, frequency and urgency.   Musculoskeletal: Negative for neck pain.   Skin: Negative for rash.   Neurological: Negative for dizziness, weakness and headaches.   Psychiatric/Behavioral: The patient is nervous/anxious.         In the past two weeks the pt has felt down, depressed, hopeless or lost interest in doing things   All other systems reviewed and are negative.      Objective   Physical Exam   Constitutional: Vital signs are normal. He appears well-developed and well-nourished. He is active and cooperative. He is easily aroused.   HENT:   Nose: Nose normal.   Eyes: Conjunctivae and lids are normal.   Neck: Trachea normal and phonation normal.   Abdominal: Normal appearance and bowel sounds are normal.   Neurological: He is alert and easily aroused. GCS eye subscore is 4. GCS verbal subscore is 5. GCS motor subscore is 6.   Skin: Skin is warm and intact. Capillary refill takes less than 2 seconds.   Psychiatric: He has a normal  mood and affect. His speech is normal and behavior is normal. Judgment and thought content normal. His mood appears not anxious. His affect is not angry. Cognition and memory are normal. He expresses no homicidal and no suicidal ideation. He expresses no suicidal plans and no homicidal plans.   Said that he was going to go live with him.         Assessment/Plan   Alpesh was seen today for establish care.    Diagnoses and all orders for this visit:    Nausea and vomiting, intractability of vomiting not specified, unspecified vomiting type    Body aches    Acute pharyngitis, unspecified etiology    Other orders  -     Discontinue: metoprolol tartrate (LOPRESSOR) 25 MG tablet; Take 1 tablet by mouth Daily.  -     metoprolol tartrate (LOPRESSOR) 25 MG tablet; Take 1 tablet by mouth Daily.  -     busPIRone (BUSPAR) 5 MG tablet; Take 1 tablet by mouth 3 (Three) Times a Day.    Encouraged cough and deep breathing, good handwashing, medicines as .  Reviewed [facility]#[id number]

## 2019-03-18 ENCOUNTER — LAB (OUTPATIENT)
Dept: LAB | Facility: OTHER | Age: 39
End: 2019-03-18

## 2019-03-18 ENCOUNTER — OFFICE VISIT (OUTPATIENT)
Dept: FAMILY MEDICINE CLINIC | Facility: CLINIC | Age: 39
End: 2019-03-18

## 2019-03-18 VITALS
HEART RATE: 59 BPM | WEIGHT: 157 LBS | SYSTOLIC BLOOD PRESSURE: 122 MMHG | OXYGEN SATURATION: 99 % | HEIGHT: 68 IN | BODY MASS INDEX: 23.79 KG/M2 | DIASTOLIC BLOOD PRESSURE: 82 MMHG | RESPIRATION RATE: 16 BRPM | TEMPERATURE: 97.5 F

## 2019-03-18 DIAGNOSIS — R52 BODY ACHES: ICD-10-CM

## 2019-03-18 DIAGNOSIS — H61.22 HEARING LOSS OF LEFT EAR DUE TO CERUMEN IMPACTION: ICD-10-CM

## 2019-03-18 DIAGNOSIS — J01.00 ACUTE NON-RECURRENT MAXILLARY SINUSITIS: Primary | ICD-10-CM

## 2019-03-18 LAB
FLUAV AG NPH QL: NEGATIVE
FLUBV AG NPH QL IA: NEGATIVE
S PYO AG THROAT QL: NEGATIVE

## 2019-03-18 PROCEDURE — 87880 STREP A ASSAY W/OPTIC: CPT | Performed by: PHYSICIAN ASSISTANT

## 2019-03-18 PROCEDURE — 99213 OFFICE O/P EST LOW 20 MIN: CPT | Performed by: PHYSICIAN ASSISTANT

## 2019-03-18 PROCEDURE — 87081 CULTURE SCREEN ONLY: CPT | Performed by: PHYSICIAN ASSISTANT

## 2019-03-18 PROCEDURE — 87804 INFLUENZA ASSAY W/OPTIC: CPT | Performed by: PHYSICIAN ASSISTANT

## 2019-03-18 PROCEDURE — 96372 THER/PROPH/DIAG INJ SC/IM: CPT | Performed by: PHYSICIAN ASSISTANT

## 2019-03-18 RX ORDER — METHYLPREDNISOLONE ACETATE 80 MG/ML
80 INJECTION, SUSPENSION INTRA-ARTICULAR; INTRALESIONAL; INTRAMUSCULAR; SOFT TISSUE ONCE
Status: COMPLETED | OUTPATIENT
Start: 2019-03-18 | End: 2019-03-18

## 2019-03-18 RX ORDER — FLUTICASONE PROPIONATE 50 MCG
2 SPRAY, SUSPENSION (ML) NASAL DAILY
Qty: 1 BOTTLE | Refills: 0 | Status: SHIPPED | OUTPATIENT
Start: 2019-03-18 | End: 2019-08-09

## 2019-03-18 RX ORDER — GUAIFENESIN, PSEUDOEPHEDRINE HYDROCHLORIDE 600; 60 MG/1; MG/1
1 TABLET, EXTENDED RELEASE ORAL EVERY 12 HOURS
Qty: 20 TABLET | Refills: 0 | Status: SHIPPED | OUTPATIENT
Start: 2019-03-18 | End: 2019-03-28

## 2019-03-18 RX ORDER — AZITHROMYCIN 250 MG/1
TABLET, FILM COATED ORAL
Qty: 6 TABLET | Refills: 0 | Status: SHIPPED | OUTPATIENT
Start: 2019-03-18 | End: 2019-08-09

## 2019-03-18 RX ADMIN — METHYLPREDNISOLONE ACETATE 80 MG: 80 INJECTION, SUSPENSION INTRA-ARTICULAR; INTRALESIONAL; INTRAMUSCULAR; SOFT TISSUE at 11:45

## 2019-03-18 NOTE — PROGRESS NOTES
Subjective   Alpesh Small is a 38 y.o. male.   Pt is new to me.  Earache    There is pain in both ears. This is a new problem. The current episode started in the past 7 days. The problem occurs hourly. The problem has been unchanged. Maximum temperature: subjective. The pain is at a severity of 4/10. The pain is mild. Associated symptoms include rhinorrhea and a sore throat. Pertinent negatives include no abdominal pain, coughing, diarrhea, ear discharge, headaches, hearing loss, neck pain, rash or vomiting. He has tried nothing for the symptoms. The treatment provided no relief. There is no history of a chronic ear infection, hearing loss or a tympanostomy tube.   Sinusitis   This is a new problem. The current episode started in the past 7 days. The problem is unchanged. Maximum temperature: subjective. His pain is at a severity of 4/10. The pain is mild. Associated symptoms include congestion, ear pain, sinus pressure, sneezing and a sore throat. Pertinent negatives include no chills, coughing, diaphoresis, headaches, hoarse voice, neck pain, shortness of breath or swollen glands. Past treatments include nothing. The treatment provided no relief.      Pt present today for a sick visit. Pt admits to feeling unwell x 3 days. Pt admits to nasal congestion, rhinorrhea, sore throat, bilateral ear pressure/pain. Admits to subjective fever x 2 days ago.    Denies cough, body aches, chills.     Pt admits to taking stefania-seltzer otc x yesterday.  The following portions of the patient's history were reviewed and updated as appropriate: allergies, current medications, past family history, past medical history, past social history, past surgical history and problem list.    Review of Systems   Constitutional: Positive for fatigue and fever (subjective, yesterday). Negative for activity change, appetite change, chills, diaphoresis, unexpected weight gain and unexpected weight loss.   HENT: Positive for congestion, ear  pain, postnasal drip, rhinorrhea, sinus pressure, sneezing and sore throat. Negative for dental problem, drooling, ear discharge, facial swelling, hearing loss, hoarse voice, mouth sores, nosebleeds, tinnitus, trouble swallowing and voice change.    Eyes: Negative for blurred vision, double vision and visual disturbance.   Respiratory: Negative for apnea, cough, choking, chest tightness, shortness of breath, wheezing and stridor.    Cardiovascular: Negative for chest pain, palpitations and leg swelling.   Gastrointestinal: Negative for abdominal distention, abdominal pain, constipation, diarrhea, nausea, vomiting, GERD and indigestion.   Endocrine: Negative.    Musculoskeletal: Negative for arthralgias, back pain, gait problem, joint swelling, myalgias, neck pain, neck stiffness and bursitis.   Skin: Negative.  Negative for rash.   Neurological: Negative for dizziness, weakness, light-headedness, headache and confusion.   Psychiatric/Behavioral: Negative.         Objective   Physical Exam   Constitutional: He is oriented to person, place, and time. Vital signs are normal. He appears well-developed and well-nourished. He is active and cooperative.  Non-toxic appearance. He does not have a sickly appearance. He does not appear ill. No distress.   HENT:   Head: Normocephalic and atraumatic.   Right Ear: Hearing, external ear and ear canal normal. No drainage, swelling or tenderness. Tympanic membrane is bulging. Tympanic membrane is not perforated, not erythematous and not retracted. Tympanic membrane mobility is normal. A middle ear effusion (serous) is present. No decreased hearing is noted. cerumen impaction is not present.  Left Ear: Hearing, external ear and ear canal normal. No drainage, swelling or tenderness. Tympanic membrane is not perforated, not erythematous, not retracted and not bulging. Tympanic membrane mobility is normal.  No middle ear effusion. No decreased hearing is noted. An impacted cerumen is  present.  Nose: Mucosal edema, rhinorrhea and congestion present. Right sinus exhibits maxillary sinus tenderness. Right sinus exhibits no frontal sinus tenderness. Left sinus exhibits maxillary sinus tenderness. Left sinus exhibits no frontal sinus tenderness.   Mouth/Throat: Mucous membranes are normal. Mucous membranes are not pale, not dry and not cyanotic. Posterior oropharyngeal erythema present. No oropharyngeal exudate, posterior oropharyngeal edema or tonsillar abscesses. Tonsils are 0 on the right. Tonsils are 0 on the left. No tonsillar exudate.   Eyes: Conjunctivae and EOM are normal. Pupils are equal, round, and reactive to light.   Neck: Normal range of motion. Neck supple.   Cardiovascular: Normal rate, regular rhythm, normal heart sounds and intact distal pulses. Exam reveals no gallop and no friction rub.   No murmur heard.  Pulmonary/Chest: Effort normal and breath sounds normal. No stridor. No respiratory distress. He has no wheezes. He has no rales. He exhibits no tenderness.   Abdominal: Soft. Bowel sounds are normal.   Musculoskeletal: Normal range of motion.   Lymphadenopathy:     He has cervical adenopathy (mild, bilateral, anterior).   Neurological: He is alert and oriented to person, place, and time.   Skin: Skin is warm and dry. Capillary refill takes less than 2 seconds. No rash noted. He is not diaphoretic. No erythema. No pallor.   Psychiatric: He has a normal mood and affect. His behavior is normal. Judgment and thought content normal.   Nursing note and vitals reviewed.    Vitals:    03/18/19 0942   BP: 122/82   Pulse: 59   Resp: 16   Temp: 97.5 °F (36.4 °C)   SpO2: 99%         Assessment/Plan   Alpesh was seen today for earache and sore throat.    Diagnoses and all orders for this visit:    Body aches  -     Rapid Strep A Screen - , Throat; Future  -     Influenza Antigen, Rapid - , Nasopharynx; Future    Acute non-recurrent maxillary sinusitis  -     fluticasone (FLONASE) 50 MCG/ACT  nasal spray; 2 sprays into the nostril(s) as directed by provider Daily. Administer 2 sprays in each nostril for each dose.  -     azithromycin (ZITHROMAX) 250 MG tablet; Take 2 tablets the first day, then 1 tablet daily for 4 days.  -     pseudoephedrine-guaifenesin (MUCINEX D)  MG per 12 hr tablet; Take 1 tablet by mouth Every 12 (Twelve) Hours for 10 days.  -     methylPREDNISolone acetate (DEPO-medrol) injection 80 mg; Inject 1 mL into the appropriate muscle as directed by prescriber 1 (One) Time.    Hearing loss of left ear due to cerumen impaction  -     carbamide peroxide (DEBROX) 6.5 % otic solution; Administer 5 drops into the left ear 2 (Two) Times a Day for 4 days.      Acute non-recurrent maxillary sinusitis - prescription for flonase, zpak, mucinex d sent to pharmacy. Depo-medrol IM injection, as above given to pt in office. Rapid flu swab & rapid strep swab - negative, discussed with pt in office. Rapid strep swab sent for strep throat culture. Will call pt with results when received. Advised pt to increase fluid intake & maintain good hand hygiene. Advised pt to cut down/quit smoking. Pt declines tobacco cessation information today. Advised pt to continue alternating tylenol/ibuprofen 400mg q 4-6 hours prn otc.     Left ear cerumen impaction - prescription for debrox drops, as above, sent to pharmacy.     Patient educated to follow up sooner than next scheduled appointment if symptoms worsen or do not improve. Patient stated understanding and has agreed with plan of care. After visit summary was printed and given to patient.       This document has been electronically signed by Lani Ferrara PA-C on March 18, 2019 3:37 PM,.

## 2019-03-21 LAB — BACTERIA SPEC AEROBE CULT: NORMAL

## 2019-06-07 DIAGNOSIS — F41.9 ANXIETY: ICD-10-CM

## 2019-06-07 DIAGNOSIS — I10 HYPERTENSION, UNSPECIFIED TYPE: Primary | ICD-10-CM

## 2019-06-07 RX ORDER — BUSPIRONE HYDROCHLORIDE 5 MG/1
5 TABLET ORAL 3 TIMES DAILY
Qty: 60 TABLET | Refills: 1 | Status: SHIPPED | OUTPATIENT
Start: 2019-06-07 | End: 2019-10-14

## 2019-08-09 ENCOUNTER — APPOINTMENT (OUTPATIENT)
Dept: GENERAL RADIOLOGY | Facility: HOSPITAL | Age: 39
End: 2019-08-09

## 2019-08-09 ENCOUNTER — APPOINTMENT (OUTPATIENT)
Dept: CT IMAGING | Facility: HOSPITAL | Age: 39
End: 2019-08-09

## 2019-08-09 ENCOUNTER — HOSPITAL ENCOUNTER (EMERGENCY)
Facility: HOSPITAL | Age: 39
Discharge: HOME | End: 2019-08-10
Attending: FAMILY MEDICINE | Admitting: FAMILY MEDICINE

## 2019-08-09 DIAGNOSIS — R19.7 DIARRHEA OF PRESUMED INFECTIOUS ORIGIN: Primary | ICD-10-CM

## 2019-08-09 DIAGNOSIS — K52.9 COLITIS: ICD-10-CM

## 2019-08-09 LAB
ALBUMIN SERPL-MCNC: 4.3 G/DL (ref 3.5–5.2)
ALBUMIN/GLOB SERPL: 1.3 G/DL
ALP SERPL-CCNC: 132 U/L (ref 39–117)
ALT SERPL W P-5'-P-CCNC: 18 U/L (ref 1–41)
ANION GAP SERPL CALCULATED.3IONS-SCNC: 11 MMOL/L (ref 5–15)
AST SERPL-CCNC: 14 U/L (ref 1–40)
BASOPHILS # BLD AUTO: 0.09 10*3/MM3 (ref 0–0.2)
BASOPHILS NFR BLD AUTO: 0.5 % (ref 0–1.5)
BILIRUB SERPL-MCNC: 0.8 MG/DL (ref 0.2–1.2)
BILIRUB UR QL STRIP: ABNORMAL
BUN BLD-MCNC: 23 MG/DL (ref 6–20)
BUN/CREAT SERPL: 23.5 (ref 7–25)
CALCIUM SPEC-SCNC: 9.1 MG/DL (ref 8.6–10.5)
CHLORIDE SERPL-SCNC: 101 MMOL/L (ref 98–107)
CK SERPL-CCNC: 32 U/L (ref 20–200)
CLARITY UR: CLEAR
CO2 SERPL-SCNC: 23 MMOL/L (ref 22–29)
COLOR UR: YELLOW
CREAT BLD-MCNC: 0.98 MG/DL (ref 0.76–1.27)
DEPRECATED RDW RBC AUTO: 39.8 FL (ref 37–54)
EOSINOPHIL # BLD AUTO: 0.01 10*3/MM3 (ref 0–0.4)
EOSINOPHIL NFR BLD AUTO: 0.1 % (ref 0.3–6.2)
ERYTHROCYTE [DISTWIDTH] IN BLOOD BY AUTOMATED COUNT: 12.5 % (ref 12.3–15.4)
GFR SERPL CREATININE-BSD FRML MDRD: 85 ML/MIN/1.73
GLOBULIN UR ELPH-MCNC: 3.3 GM/DL
GLUCOSE BLD-MCNC: 126 MG/DL (ref 65–99)
GLUCOSE UR STRIP-MCNC: NEGATIVE MG/DL
HCT VFR BLD AUTO: 48.1 % (ref 37.5–51)
HGB BLD-MCNC: 16.3 G/DL (ref 13–17.7)
HGB UR QL STRIP.AUTO: NEGATIVE
HOLD SPECIMEN: NORMAL
HOLD SPECIMEN: NORMAL
IMM GRANULOCYTES # BLD AUTO: 0.21 10*3/MM3 (ref 0–0.05)
IMM GRANULOCYTES NFR BLD AUTO: 1.2 % (ref 0–0.5)
KETONES UR QL STRIP: ABNORMAL
LEUKOCYTE ESTERASE UR QL STRIP.AUTO: NEGATIVE
LIPASE SERPL-CCNC: 13 U/L (ref 13–60)
LYMPHOCYTES # BLD AUTO: 0.5 10*3/MM3 (ref 0.7–3.1)
LYMPHOCYTES NFR BLD AUTO: 3 % (ref 19.6–45.3)
MCH RBC QN AUTO: 29.5 PG (ref 26.6–33)
MCHC RBC AUTO-ENTMCNC: 33.9 G/DL (ref 31.5–35.7)
MCV RBC AUTO: 87 FL (ref 79–97)
MONOCYTES # BLD AUTO: 1.34 10*3/MM3 (ref 0.1–0.9)
MONOCYTES NFR BLD AUTO: 7.9 % (ref 5–12)
NEUTROPHILS # BLD AUTO: 14.76 10*3/MM3 (ref 1.7–7)
NEUTROPHILS NFR BLD AUTO: 87.3 % (ref 42.7–76)
NITRITE UR QL STRIP: NEGATIVE
NRBC BLD AUTO-RTO: 0 /100 WBC (ref 0–0.2)
PH UR STRIP.AUTO: 5.5 [PH] (ref 5–9)
PLATELET # BLD AUTO: 282 10*3/MM3 (ref 140–450)
PMV BLD AUTO: 9.3 FL (ref 6–12)
POTASSIUM BLD-SCNC: 4.3 MMOL/L (ref 3.5–5.2)
PROT SERPL-MCNC: 7.6 G/DL (ref 6–8.5)
PROT UR QL STRIP: NEGATIVE
RBC # BLD AUTO: 5.53 10*6/MM3 (ref 4.14–5.8)
SODIUM BLD-SCNC: 135 MMOL/L (ref 136–145)
SP GR UR STRIP: 1.03 (ref 1–1.03)
UROBILINOGEN UR QL STRIP: ABNORMAL
WBC NRBC COR # BLD: 16.91 10*3/MM3 (ref 3.4–10.8)
WHOLE BLOOD HOLD SPECIMEN: NORMAL
WHOLE BLOOD HOLD SPECIMEN: NORMAL

## 2019-08-09 PROCEDURE — 99284 EMERGENCY DEPT VISIT MOD MDM: CPT

## 2019-08-09 PROCEDURE — 85025 COMPLETE CBC W/AUTO DIFF WBC: CPT

## 2019-08-09 PROCEDURE — 96375 TX/PRO/DX INJ NEW DRUG ADDON: CPT

## 2019-08-09 PROCEDURE — 82550 ASSAY OF CK (CPK): CPT | Performed by: FAMILY MEDICINE

## 2019-08-09 PROCEDURE — 80053 COMPREHEN METABOLIC PANEL: CPT | Performed by: FAMILY MEDICINE

## 2019-08-09 PROCEDURE — 81003 URINALYSIS AUTO W/O SCOPE: CPT | Performed by: FAMILY MEDICINE

## 2019-08-09 PROCEDURE — 96374 THER/PROPH/DIAG INJ IV PUSH: CPT

## 2019-08-09 PROCEDURE — 74022 RADEX COMPL AQT ABD SERIES: CPT

## 2019-08-09 PROCEDURE — 83690 ASSAY OF LIPASE: CPT | Performed by: FAMILY MEDICINE

## 2019-08-09 PROCEDURE — 25010000002 PROMETHAZINE PER 50 MG: Performed by: FAMILY MEDICINE

## 2019-08-09 PROCEDURE — 25010000002 IOPAMIDOL 61 % SOLUTION: Performed by: FAMILY MEDICINE

## 2019-08-09 PROCEDURE — 87086 URINE CULTURE/COLONY COUNT: CPT | Performed by: FAMILY MEDICINE

## 2019-08-09 PROCEDURE — 74177 CT ABD & PELVIS W/CONTRAST: CPT

## 2019-08-09 RX ORDER — PROMETHAZINE HYDROCHLORIDE 25 MG/ML
12.5 INJECTION, SOLUTION INTRAMUSCULAR; INTRAVENOUS ONCE
Status: COMPLETED | OUTPATIENT
Start: 2019-08-09 | End: 2019-08-09

## 2019-08-09 RX ORDER — SODIUM CHLORIDE 0.9 % (FLUSH) 0.9 %
10 SYRINGE (ML) INJECTION AS NEEDED
Status: DISCONTINUED | OUTPATIENT
Start: 2019-08-09 | End: 2019-08-10 | Stop reason: HOSPADM

## 2019-08-09 RX ORDER — FAMOTIDINE 10 MG/ML
20 INJECTION, SOLUTION INTRAVENOUS ONCE
Status: COMPLETED | OUTPATIENT
Start: 2019-08-09 | End: 2019-08-09

## 2019-08-09 RX ADMIN — PROMETHAZINE HYDROCHLORIDE 12.5 MG: 25 INJECTION INTRAMUSCULAR; INTRAVENOUS at 21:57

## 2019-08-09 RX ADMIN — FAMOTIDINE 20 MG: 10 INJECTION, SOLUTION INTRAVENOUS at 21:57

## 2019-08-09 RX ADMIN — Medication 10 ML: at 20:47

## 2019-08-09 RX ADMIN — IOPAMIDOL 90 ML: 612 INJECTION, SOLUTION INTRAVENOUS at 23:05

## 2019-08-09 RX ADMIN — SODIUM CHLORIDE 1000 ML: 900 INJECTION, SOLUTION INTRAVENOUS at 21:57

## 2019-08-10 VITALS
DIASTOLIC BLOOD PRESSURE: 63 MMHG | BODY MASS INDEX: 23.36 KG/M2 | OXYGEN SATURATION: 97 % | WEIGHT: 154.1 LBS | TEMPERATURE: 98.3 F | RESPIRATION RATE: 18 BRPM | HEART RATE: 80 BPM | HEIGHT: 68 IN | SYSTOLIC BLOOD PRESSURE: 105 MMHG

## 2019-08-10 RX ORDER — METRONIDAZOLE 500 MG/1
500 TABLET ORAL 4 TIMES DAILY
Qty: 28 TABLET | Refills: 0 | Status: SHIPPED | OUTPATIENT
Start: 2019-08-10 | End: 2019-08-29

## 2019-08-10 RX ORDER — ACETAMINOPHEN 500 MG
1000 TABLET ORAL ONCE
Status: COMPLETED | OUTPATIENT
Start: 2019-08-10 | End: 2019-08-10

## 2019-08-10 RX ORDER — METRONIDAZOLE 500 MG/1
500 TABLET ORAL ONCE
Status: COMPLETED | OUTPATIENT
Start: 2019-08-10 | End: 2019-08-10

## 2019-08-10 RX ORDER — CIPROFLOXACIN 500 MG/1
500 TABLET, FILM COATED ORAL 2 TIMES DAILY
Qty: 20 TABLET | Refills: 0 | Status: SHIPPED | OUTPATIENT
Start: 2019-08-10 | End: 2019-08-29

## 2019-08-10 RX ORDER — ONDANSETRON 4 MG/1
4 TABLET, ORALLY DISINTEGRATING ORAL EVERY 6 HOURS PRN
Qty: 10 TABLET | Refills: 0 | Status: SHIPPED | OUTPATIENT
Start: 2019-08-10 | End: 2019-10-14

## 2019-08-10 RX ORDER — PROMETHAZINE HYDROCHLORIDE 25 MG/1
25 TABLET ORAL EVERY 6 HOURS PRN
Qty: 15 TABLET | Refills: 0 | Status: SHIPPED | OUTPATIENT
Start: 2019-08-10 | End: 2019-10-14

## 2019-08-10 RX ORDER — HYDROCODONE BITARTRATE AND ACETAMINOPHEN 5; 325 MG/1; MG/1
1 TABLET ORAL EVERY 6 HOURS PRN
Qty: 12 TABLET | Refills: 0 | Status: SHIPPED | OUTPATIENT
Start: 2019-08-10 | End: 2019-08-29

## 2019-08-10 RX ADMIN — METRONIDAZOLE 500 MG: 500 TABLET ORAL at 02:22

## 2019-08-10 RX ADMIN — LEVOFLOXACIN 750 MG: 500 TABLET, FILM COATED ORAL at 02:21

## 2019-08-10 RX ADMIN — ACETAMINOPHEN 1000 MG: 500 TABLET ORAL at 01:30

## 2019-08-10 NOTE — ED PROVIDER NOTES
Subjective     Abdominal Pain   Pain location:  Generalized  Pain quality: aching    Pain radiates to:  Does not radiate  Pain severity:  Moderate  Onset quality:  Unable to specify  Duration:  1 week  Timing:  Constant  Progression:  Waxing and waning  Chronicity:  New  Relieved by:  Nothing  Worsened by:  Nothing  Associated symptoms: diarrhea, nausea and vomiting    Associated symptoms: no chest pain, no chills, no constipation, no cough, no dysuria, no fatigue, no fever, no shortness of breath and no sore throat    Risk factors: not obese    Vomiting   The primary symptoms include abdominal pain, nausea, vomiting and diarrhea. Primary symptoms do not include fever, fatigue, dysuria, myalgias or rash.   The illness does not include chills or constipation.   Diarrhea   The primary symptoms include abdominal pain, nausea, vomiting and diarrhea. Primary symptoms do not include fever, fatigue, dysuria, myalgias or rash.   The illness does not include chills or constipation.       Review of Systems   Constitutional: Negative for appetite change, chills, diaphoresis, fatigue and fever.   HENT: Negative for congestion, ear discharge, ear pain, nosebleeds, rhinorrhea, sinus pressure, sore throat and trouble swallowing.    Eyes: Negative for discharge and redness.   Respiratory: Negative for apnea, cough, chest tightness, shortness of breath and wheezing.    Cardiovascular: Negative for chest pain.   Gastrointestinal: Positive for abdominal pain, diarrhea, nausea and vomiting. Negative for constipation.   Endocrine: Negative for polyuria.   Genitourinary: Negative for dysuria, frequency and urgency.   Musculoskeletal: Negative for myalgias and neck pain.   Skin: Negative for color change and rash.   Allergic/Immunologic: Negative for immunocompromised state.   Neurological: Negative for dizziness, seizures, syncope, weakness, light-headedness and headaches.   Hematological: Negative for adenopathy. Does not bruise/bleed  easily.   Psychiatric/Behavioral: Negative for behavioral problems and confusion.   All other systems reviewed and are negative.      Past Medical History:   Diagnosis Date   • Congenital absence of one kidney    • SVT (supraventricular tachycardia) (CMS/[facility]) 2016       Allergies   Allergen Reactions   • Codeine    • Penicillins Hives       History reviewed. No pertinent surgical history.    Family History   Problem Relation Age of Onset   • Cancer Father        Social History     Socioeconomic History   • Marital status:      Spouse name: Not on file   • Number of children: Not on file   • Years of education: Not on file   • Highest education level: Not on file   Tobacco Use   • Smoking status: Former Smoker     Packs/day: 1.00     Types: Electronic Cigarette, Cigarettes     Last attempt to quit: 2018     Years since quittin.7   • Smokeless tobacco: Never Used   Substance and Sexual Activity   • Alcohol use: No     Comment: occasionally   • Drug use: No   • Sexual activity: Defer           Objective   Physical Exam   Constitutional: He is oriented to person, place, and time. He appears well-developed and well-nourished.   HENT:   Head: Normocephalic and atraumatic.   Nose: Nose normal.   Mouth/Throat: Oropharynx is clear and moist.   Eyes: Conjunctivae and EOM are normal. Pupils are equal, round, and reactive to light. Right eye exhibits no discharge. Left eye exhibits no discharge. No scleral icterus.   Neck: Normal range of motion. Neck supple. No tracheal deviation present.   Cardiovascular: Normal rate, regular rhythm and normal heart sounds.   No murmur heard.  Pulmonary/Chest: Effort normal and breath sounds normal. No stridor. No respiratory distress. He has no wheezes. He has no rales.   Abdominal: Soft. Bowel sounds are normal. He exhibits no distension and no mass. There is tenderness in the right upper quadrant and epigastric area. There is no rebound and no guarding.    Musculoskeletal: He exhibits no edema.   Neurological: He is alert and oriented to person, place, and time. Coordination normal.   Skin: Skin is warm and dry. No rash noted. No erythema.   Psychiatric: He has a normal mood and affect. His behavior is normal. Thought content normal.   Nursing note and vitals reviewed.      Procedures           ED Course  ED Course as of Aug 10 0227   Sat Aug 10, 2019   0214 Patient was advised to follow-up with primary care and with gastroenterology in several days to 1 week if no improvement.  He is return the emergency department should his pain worsen or not continue to improve at home.  [CB]      ED Course User Index  [CB] Nicholas Spivey MD        Labs Reviewed   COMPREHENSIVE METABOLIC PANEL - Abnormal; Notable for the following components:       Result Value    Glucose 126 (*)     BUN 23 (*)     Sodium 135 (*)     Alkaline Phosphatase 132 (*)     All other components within normal limits    Narrative:     GFR Normal >60  Chronic Kidney Disease <60  Kidney Failure <15   URINALYSIS W/ MICROSCOPIC IF INDICATED (NO CULTURE) - Abnormal; Notable for the following components:    Ketones, UA Trace (*)     Bilirubin, UA Small (1+) (*)     All other components within normal limits    Narrative:     Urine microscopic not indicated.   CBC WITH AUTO DIFFERENTIAL - Abnormal; Notable for the following components:    WBC 16.91 (*)     Neutrophil % 87.3 (*)     Lymphocyte % 3.0 (*)     Eosinophil % 0.1 (*)     Immature Grans % 1.2 (*)     Neutrophils, Absolute 14.76 (*)     Lymphocytes, Absolute 0.50 (*)     Monocytes, Absolute 1.34 (*)     Immature Grans, Absolute 0.21 (*)     All other components within normal limits   LIPASE - Normal   CK - Normal   URINE CULTURE   RAINBOW DRAW    Narrative:     The following orders were created for panel order [place] Draw.  Procedure                               Abnormality         Status                     ---------                                -----------         ------                     Light Blue Top[[id number]]                                   Final result               Green Top (Gel)[[id number]]                                  Final result               Lavender Top[[id number]]                                     Final result               Gold Top - SST[[id number]]                                   Final result                 Please view results for these tests on the individual orders.   CBC AND DIFFERENTIAL    Narrative:     The following orders were created for panel order CBC & Differential.  Procedure                               Abnormality         Status                     ---------                               -----------         ------                     CBC Auto Differential[[id number]]        Abnormal            Final result                 Please view results for these tests on the individual orders.   LIGHT BLUE TOP   GREEN TOP   LAVENDER TOP   GOLD TOP - SST       CT Abdomen Pelvis With Contrast   Final Result   CONCLUSION:    The right side of the colon shows fat within the wall and bowel   wall thickening from the cecum to the mid transverse colon. The   remainder of the colon and the rectum appears to to have a normal   wall but contains liquid stool. Findings are suspicious for   Crohn's disease or bowel wall ischemia, possibly chronic.   Consider direct visualization.   Absent left kidney.      Electronically signed by:  Juan Singh MD  8/10/2019 1:58 AM [email or web address]   Workstation: [id number]      XR Abdomen 2 View With Chest 1 View   Final Result   Nonspecific bowel gas pattern.      Electronically signed by:  Eleazar Jenkins MD  8/10/2019 12:30 AM   [email or web address] Workstation: [id number]            Request # : [id number]          MDM      Final diagnoses:   Diarrhea of presumed infectious origin   Colitis            Nicholas Spivey MD  08/10/19 0215       Nicholas Spivey MD  08/10/19 0227

## 2019-08-10 NOTE — ED NOTES
Pt reports on cefzil since Friday for a URI. Pt reports N/V/D and RUQ abd pain that started this morning. Pt states he feels dehydrated and reports some flank pain (pt has one kidney)     Deyanira Marin, RN  08/09/19 2039

## 2019-08-11 LAB — BACTERIA SPEC AEROBE CULT: NO GROWTH

## 2019-08-29 ENCOUNTER — OFFICE VISIT (OUTPATIENT)
Dept: GASTROENTEROLOGY | Facility: CLINIC | Age: 39
End: 2019-08-29

## 2019-08-29 VITALS
WEIGHT: 150.8 LBS | HEIGHT: 68 IN | BODY MASS INDEX: 22.85 KG/M2 | DIASTOLIC BLOOD PRESSURE: 70 MMHG | HEART RATE: 71 BPM | SYSTOLIC BLOOD PRESSURE: 128 MMHG | OXYGEN SATURATION: 98 %

## 2019-08-29 DIAGNOSIS — R93.5 ABNORMAL CT OF THE ABDOMEN: ICD-10-CM

## 2019-08-29 DIAGNOSIS — R10.13 EPIGASTRIC PAIN: Primary | ICD-10-CM

## 2019-08-29 PROCEDURE — 99203 OFFICE O/P NEW LOW 30 MIN: CPT | Performed by: INTERNAL MEDICINE

## 2019-08-29 RX ORDER — SODIUM, POTASSIUM,MAG SULFATES 17.5-3.13G
1 SOLUTION, RECONSTITUTED, ORAL ORAL EVERY 12 HOURS
Qty: 1 BOTTLE | Refills: 0 | Status: SHIPPED | OUTPATIENT
Start: 2019-08-29 | End: 2019-09-04 | Stop reason: HOSPADM

## 2019-08-29 RX ORDER — SODIUM CHLORIDE 0.9 % (FLUSH) 0.9 %
10 SYRINGE (ML) INJECTION AS NEEDED
Status: CANCELLED | OUTPATIENT
Start: 2019-08-29

## 2019-08-29 RX ORDER — SODIUM CHLORIDE 0.9 % (FLUSH) 0.9 %
3 SYRINGE (ML) INJECTION EVERY 12 HOURS SCHEDULED
Status: CANCELLED | OUTPATIENT
Start: 2019-08-29

## 2019-08-29 RX ORDER — CLINDAMYCIN HYDROCHLORIDE 150 MG/1
150 CAPSULE ORAL 4 TIMES DAILY
COMMUNITY
End: 2019-10-14

## 2019-08-29 RX ORDER — HYDROCODONE BITARTRATE AND ACETAMINOPHEN 10; 325 MG/1; MG/1
1 TABLET ORAL EVERY 6 HOURS PRN
COMMUNITY
End: 2019-10-14

## 2019-08-29 RX ORDER — DEXTROSE AND SODIUM CHLORIDE 5; .45 G/100ML; G/100ML
30 INJECTION, SOLUTION INTRAVENOUS CONTINUOUS PRN
Status: CANCELLED | OUTPATIENT
Start: 2019-09-04

## 2019-08-29 NOTE — PROGRESS NOTES
[facility]      Chief Complaint:   Chief Complaint   Patient presents with   • [facility] ED 08/09/2109     Diarrhea.  Colitis.       Subjective     HPI:   Patient with recent evaluation in the ER for abdominal pain with nausea and vomiting.  Patient has CT scan of the abdomen which showed inflammatory changes in the ascending colon consistent with possible Crohn's disease versus ischemic colitis.  Patient states his abdominal pain has resolved some but patient gets frequent episodes of epigastric abdominal pain for the past few years with episodes of nausea vomiting.    Plan; we will schedule patient for EGD and colonoscopy to evaluate.  We will do biopsies of the ascending colon and terminal ileum if possible    Past Medical History:   Past Medical History:   Diagnosis Date   • Congenital absence of one kidney    • SVT (supraventricular tachycardia) (CMS/[facility]) 12/2016       Past Surgical History:  Past Surgical History:   Procedure Laterality Date   • APPENDECTOMY         Family History:  Family History   Problem Relation Age of Onset   • Lung cancer Father    • Heart attack Father    • Stroke Father    • Diabetes Father    • Hypertension Father    • Hypertension Mother        Social History:   reports that he quit smoking about 9 months ago. His smoking use included electronic cigarette and cigarettes. He has a 9.00 pack-year smoking history. He has never used smokeless tobacco. He reports that he drinks alcohol. He reports that he does not use drugs.    Medications:   Prior to Admission medications    Medication Sig Start Date End Date Taking? Authorizing Provider   aspirin 81 MG chewable tablet Chew 81 mg Daily.   Yes ProviderJake MD   clindamycin (CLEOCIN) 150 MG capsule Take 150 mg by mouth 4 (Four) Times a Day.   Yes ProviderJake MD   HYDROcodone-acetaminophen (NORCO)  MG per tablet Take 1 tablet by mouth Every 6 (Six) Hours As Needed for Moderate Pain .   Yes  Provider, MD Jake   metoprolol tartrate (LOPRESSOR) 25 MG tablet Take 1 tablet by mouth Daily. 6/7/19  Yes Vanita Espino APRN   ondansetron ODT (ZOFRAN-ODT) 4 MG disintegrating tablet Take 1 tablet by mouth Every 6 (Six) Hours As Needed for Nausea or Vomiting. 8/10/19  Yes Nicholas Spivey MD   promethazine (PHENERGAN) 25 MG tablet Take 1 tablet by mouth Every 6 (Six) Hours As Needed for Nausea or Vomiting. 8/10/19  Yes Nicholas Spivey MD   busPIRone (BUSPAR) 5 MG tablet Take 1 tablet by mouth 3 (Three) Times a Day. 6/7/19   Vanita Espino APRN   sodium-potassium-magnesium sulfates (SUPREP) 17.5-3.13-1.6 GM/177ML solution oral solution Take 1 bottle by mouth Every 12 (Twelve) Hours. 8/29/19   Alpesh Jackson MD   ciprofloxacin (CIPRO) 500 MG tablet Take 1 tablet by mouth 2 (Two) Times a Day. 8/10/19 8/29/19  Nicholas Spivey MD   HYDROcodone-acetaminophen (NORCO) 5-325 MG per tablet Take 1 tablet by mouth Every 6 (Six) Hours As Needed for Moderate Pain . 8/10/19 8/29/19  Nicholas Spivey MD   metroNIDAZOLE (FLAGYL) 500 MG tablet Take 1 tablet by mouth 4 (Four) Times a Day. 8/10/19 8/29/19  Nicholas Spivey MD       Allergies:  Codeine and Penicillins    ROS:    Review of Systems   Constitutional: Negative for activity change, appetite change and unexpected weight change.   HENT: Negative for congestion, sore throat and trouble swallowing.    Respiratory: Negative for cough, choking and shortness of breath.    Cardiovascular: Negative for chest pain.   Gastrointestinal: Positive for abdominal pain, nausea and vomiting. Negative for abdominal distention, anal bleeding, blood in stool, constipation, diarrhea and rectal pain.   Endocrine: Negative for heat intolerance, polydipsia and polyphagia.   Genitourinary: Negative for difficulty urinating.   Musculoskeletal: Negative for arthralgias.   Skin: Negative for color change, pallor, rash and wound.   Allergic/Immunologic: Negative  "for food allergies.   Neurological: Negative for dizziness, syncope, weakness and headaches.   Psychiatric/Behavioral: Negative for agitation, behavioral problems, confusion and decreased concentration.     Objective     Blood pressure 128/70, pulse 71, height 172.7 cm (68\"), weight 68.4 kg (150 lb 12.8 oz), SpO2 98 %.    Physical Exam   Constitutional: He is oriented to person, place, and time. He appears well-developed and well-nourished. No distress.   HENT:   Head: Normocephalic and atraumatic.   Cardiovascular: Normal rate, regular rhythm, normal heart sounds and intact distal pulses. Exam reveals no gallop and no friction rub.   No murmur heard.  Pulmonary/Chest: Breath sounds normal. No respiratory distress. He has no wheezes. He has no rales. He exhibits no tenderness.   Abdominal: Soft. Bowel sounds are normal. He exhibits no distension and no mass. There is no tenderness. There is no rebound and no guarding. No hernia.   Musculoskeletal: Normal range of motion. He exhibits no edema.   Neurological: He is alert and oriented to person, place, and time.   Skin: Skin is warm and dry. No rash noted. He is not diaphoretic. No erythema. No pallor.   Psychiatric: He has a normal mood and affect. His behavior is normal. Judgment and thought content normal.        Assessment/Plan   Alpesh was seen today for [facility] ed 08/09/2109.    Diagnoses and all orders for this visit:    Epigastric pain  -     Case Request; Standing  -     sodium chloride 0.9 % flush 3 mL  -     sodium chloride 0.9 % flush 10 mL  -     dextrose 5 % and sodium chloride 0.45 % infusion  -     Case Request    Abnormal CT of the abdomen  -     Case Request; Standing  -     sodium chloride 0.9 % flush 3 mL  -     sodium chloride 0.9 % flush 10 mL  -     dextrose 5 % and sodium chloride 0.45 % infusion  -     Case Request    Other orders  -     Follow Anesthesia Guidelines / Standing Orders; Future  -     Obtain Informed Consent; Future  -     Implement " Anesthesia Orders Day of Procedure; Standing  -     Obtain Informed Consent; Standing  -     POC Glucose Once; Standing  -     Insert Peripheral IV; Standing  -     Saline Lock & Maintain IV Access; Standing  -     sodium-potassium-magnesium sulfates (SUPREP) 17.5-3.13-1.6 GM/177ML solution oral solution; Take 1 bottle by mouth Every 12 (Twelve) Hours.        ESOPHAGOGASTRODUODENOSCOPY (N/A), COLONOSCOPY (N/A)     Diagnosis Plan   1. Epigastric pain  Case Request    sodium chloride 0.9 % flush 3 mL    sodium chloride 0.9 % flush 10 mL    dextrose 5 % and sodium chloride 0.45 % infusion    Case Request   2. Abnormal CT of the abdomen  Case Request    sodium chloride 0.9 % flush 3 mL    sodium chloride 0.9 % flush 10 mL    dextrose 5 % and sodium chloride 0.45 % infusion    Case Request       Anticipated Surgical Procedure:  Orders Placed This Encounter   Procedures   • Follow Anesthesia Guidelines / Standing Orders     Standing Status:   Future   • Obtain Informed Consent     Standing Status:   Future     Order Specific Question:   Informed Consent Given For     Answer:   egd and colonoscopy       The risks, benefits, and alternatives of this procedure have been discussed with the patient or the responsible party- the patient understands and agrees to proceed.

## 2019-08-29 NOTE — PATIENT INSTRUCTIONS
Colitis    Colitis is inflammation of the colon. Colitis may last a short time (acute) or it may last a long time (chronic).  What are the causes?  This condition may be caused by:  · Viruses.  · Bacteria.  · Reactions to medicine.  · Certain autoimmune diseases, such as Crohn disease or ulcerative colitis.  What are the signs or symptoms?  Symptoms of this condition include:  · Diarrhea.  · Passing bloody or tarry stool.  · Pain.  · Fever.  · Vomiting.  · Tiredness (fatigue).  · Weight loss.  · Bloating.  · Sudden increase in abdominal pain.  · Having fewer bowel movements than usual.  How is this diagnosed?  This condition is diagnosed with a stool test or a blood test. You may also have other tests, including X-rays, a CT scan, or a colonoscopy.  How is this treated?  Treatment may include:  · Resting the bowel. This involves not eating or drinking for a period of time.  · Fluids that are given through an IV tube.  · Medicine for pain and diarrhea.  · Antibiotic medicines.  · Cortisone medicines.  · Surgery.  Follow these instructions at home:  Eating and drinking  · Follow instructions from your health care provider about eating or drinking restrictions.  · Drink enough fluid to keep your urine clear or pale yellow.  · Work with a dietitian to determine which foods cause your condition to flare up.  · Avoid foods that cause flare-ups.  · Eat a well-balanced diet.  Medicines  · Take over-the-counter and prescription medicines only as told by your health care provider.  · If you were prescribed an antibiotic medicine, take it as told by your health care provider. Do not stop taking the antibiotic even if you start to feel better.  General instructions  · Keep all follow-up visits as told by your health care provider. This is important.  Contact a health care provider if:  · Your symptoms do not go away.  · You develop new symptoms.  Get help right away if:  · You have a fever that does not go away with  treatment.  · You develop chills.  · You have extreme weakness, fainting, or dehydration.  · You have repeated vomiting.  · You develop severe pain in your abdomen.  · You pass bloody or tarry stool.  This information is not intended to replace advice given to you by your health care provider. Make sure you discuss any questions you have with your health care provider.  Document Released: 01/25/2006 Document Revised: 05/25/2017 Document Reviewed: 04/11/2016  [employer] Interactive Patient Education © 2019 [employer].  Diarrhea, Adult  Diarrhea is frequent loose and watery bowel movements. Diarrhea can make you feel weak and cause you to become dehydrated. Dehydration can make you tired and thirsty, cause you to have a dry mouth, and decrease how often you urinate. Diarrhea typically lasts 2-3 days. However, it can last longer if it is a sign of something more serious. It is important to treat your diarrhea as told by your health care provider.  Follow these instructions at home:  Eating and drinking  Follow these recommendations as told by your health care provider:  · Take an oral rehydration solution (ORS). This is a drink that is sold at pharmacies and retail stores.  · Drink clear fluids, such as water, ice chips, diluted fruit juice, and low-calorie sports drinks.  · Eat bland, easy-to-digest foods in small amounts as you are able. These foods include bananas, applesauce, rice, lean meats, toast, and crackers.  · Avoid drinking fluids that contain a lot of sugar or caffeine, such as energy drinks, sports drinks, and soda.  · Avoid alcohol.  · Avoid spicy or fatty foods.    General instructions  · Drink enough fluid to keep your urine clear or pale yellow.  · Wash your hands often. If soap and water are not available, use hand .  · Make sure that all people in your household wash their hands well and often.  · Take over-the-counter and prescription medicines only as told by your health care  provider.  · Rest at home while you recover.  · Watch your condition for any changes.  · Take a warm bath to relieve any burning or pain from frequent diarrhea episodes.  · Keep all follow-up visits as told by your health care provider. This is important.  Contact a health care provider if:  · You have a fever.  · Your diarrhea gets worse.  · You have new symptoms.  · You cannot keep fluids down.  · You feel light-headed or dizzy.  · You have a headache  · You have muscle cramps.  Get help right away if:  · You have chest pain.  · You feel extremely weak or you faint.  · You have bloody or black stools or stools that look like tar.  · You have severe pain, cramping, or bloating in your abdomen.  · You have trouble breathing or you are breathing very quickly.  · Your heart is beating very quickly.  · Your skin feels cold and clammy.  · You feel confused.  · You have signs of dehydration, such as:  ? Dark urine, very little urine, or no urine.  ? Cracked lips.  ? Dry mouth.  ? Sunken eyes.  ? Sleepiness.  ? Weakness.  This information is not intended to replace advice given to you by your health care provider. Make sure you discuss any questions you have with your health care provider.  Document Released: 12/08/2003 Document Revised: 08/01/2018 Document Reviewed: 08/23/2016  [employer] Interactive Patient Education © 2019 [employer]  BMI for Adults    Body mass index (BMI) is a number that is calculated from a person's weight and height. BMI may help to estimate how much of a person's weight is composed of fat. BMI can help identify those who may be at higher risk for certain medical problems.  How is BMI used with adults?  BMI is used as a screening tool to identify possible weight problems. It is used to check whether a person is obese, overweight, healthy weight, or underweight.  How is BMI calculated?  BMI measures your weight and compares it to your height. This can be done either in English (U.S[place] or metric  "measurements. Note that charts are available to help you find your BMI quickly and easily without having to do these calculations yourself.  To calculate your BMI in English (U.S[place] measurements, your health care provider will:  1. Measure your weight in pounds (lb).  2. Multiply the number of pounds by 703.  ? For example, for a person who weighs 180 lb, multiply that number by 703, which equals 126,540.  3. Measure your height in inches (in). Then multiply that number by itself to get a measurement called \"inches squared.\"  ? For example, for a person who is 70 in tall, the \"inches squared\" measurement is 70 in x 70 in, which equals 4900 inches squared.  4. Divide the total from Step 2 (number of lb x 703) by the total from Step 3 (inches squared): 126,540 ÷ 4900 = 25.8. This is your BMI.  To calculate your BMI in metric measurements, your health care provider will:  1. Measure your weight in kilograms (kg).  2. Measure your height in meters (m). Then multiply that number by itself to get a measurement called \"meters squared.\"  ? For example, for a person who is 1.75 m tall, the \"meters squared\" measurement is 1.75 m x 1.75 m, which is equal to 3.1 meters squared.  3. Divide the number of kilograms (your weight) by the meters squared number. In this example: 70 ÷ 3.1 = 22.6. This is your BMI.  How is BMI interpreted?  To interpret your results, your health care provider will use BMI charts to identify whether you are underweight, normal weight, overweight, or obese. The following guidelines will be used:  · Underweight: BMI less than 18.5.  · Normal weight: BMI between 18.5 and 24.9.  · Overweight: BMI between 25 and 29.9.  · Obese: BMI of 30 and above.  Please note:  · Weight includes both fat and muscle, so someone with a muscular build, such as an athlete, may have a BMI that is higher than 24.9. In cases like these, BMI is not an accurate measure of body fat.  · To determine if excess body fat is the cause of a " BMI of 25 or higher, further assessments may need to be done by a health care provider.  · BMI is usually interpreted in the same way for men and women.  Why is BMI a useful tool?  BMI is useful in two ways:  · Identifying a weight problem that may be related to a medical condition, or that may increase the risk for medical problems.  · Promoting lifestyle and diet changes in order to reach a healthy weight.  Summary  · Body mass index (BMI) is a number that is calculated from a person's weight and height.  · BMI may help to estimate how much of a person's weight is composed of fat. BMI can help identify those who may be at higher risk for certain medical problems.  · BMI can be measured using English measurements or metric measurements.  · To interpret your results, your health care provider will use BMI charts to identify whether you are underweight, normal weight, overweight, or obese.  This information is not intended to replace advice given to you by your health care provider. Make sure you discuss any questions you have with your health care provider.  Document Released: 08/29/2005 Document Revised: 10/31/2018 Document Reviewed: 10/31/2018  [employer] Interactive Patient Education © 2019 [employer]

## 2019-09-04 ENCOUNTER — ANESTHESIA EVENT (OUTPATIENT)
Dept: GASTROENTEROLOGY | Facility: HOSPITAL | Age: 39
End: 2019-09-04

## 2019-09-04 ENCOUNTER — ANESTHESIA (OUTPATIENT)
Dept: GASTROENTEROLOGY | Facility: HOSPITAL | Age: 39
End: 2019-09-04

## 2019-09-04 ENCOUNTER — HOSPITAL ENCOUNTER (OUTPATIENT)
Facility: HOSPITAL | Age: 39
Setting detail: HOSPITAL OUTPATIENT SURGERY
Discharge: HOME | End: 2019-09-04
Attending: INTERNAL MEDICINE | Admitting: INTERNAL MEDICINE

## 2019-09-04 VITALS
HEIGHT: 68 IN | BODY MASS INDEX: 21.72 KG/M2 | DIASTOLIC BLOOD PRESSURE: 80 MMHG | HEART RATE: 78 BPM | SYSTOLIC BLOOD PRESSURE: 115 MMHG | TEMPERATURE: 97.2 F | RESPIRATION RATE: 18 BRPM | OXYGEN SATURATION: 97 % | WEIGHT: 143.3 LBS

## 2019-09-04 DIAGNOSIS — R10.13 EPIGASTRIC PAIN: ICD-10-CM

## 2019-09-04 DIAGNOSIS — R93.5 ABNORMAL CT OF THE ABDOMEN: ICD-10-CM

## 2019-09-04 PROCEDURE — 25010000002 PROPOFOL 10 MG/ML EMULSION: Performed by: NURSE ANESTHETIST, CERTIFIED REGISTERED

## 2019-09-04 PROCEDURE — 25010000002 MIDAZOLAM PER 1 MG: Performed by: NURSE ANESTHETIST, CERTIFIED REGISTERED

## 2019-09-04 PROCEDURE — 88305 TISSUE EXAM BY PATHOLOGIST: CPT | Performed by: PATHOLOGY

## 2019-09-04 PROCEDURE — 88305 TISSUE EXAM BY PATHOLOGIST: CPT | Performed by: INTERNAL MEDICINE

## 2019-09-04 PROCEDURE — 43239 EGD BIOPSY SINGLE/MULTIPLE: CPT | Performed by: INTERNAL MEDICINE

## 2019-09-04 PROCEDURE — 45380 COLONOSCOPY AND BIOPSY: CPT | Performed by: INTERNAL MEDICINE

## 2019-09-04 RX ORDER — DEXTROSE AND SODIUM CHLORIDE 5; .45 G/100ML; G/100ML
30 INJECTION, SOLUTION INTRAVENOUS CONTINUOUS PRN
Status: DISCONTINUED | OUTPATIENT
Start: 2019-09-04 | End: 2019-09-04 | Stop reason: HOSPADM

## 2019-09-04 RX ORDER — LIDOCAINE HYDROCHLORIDE 20 MG/ML
INJECTION, SOLUTION INTRAVENOUS AS NEEDED
Status: DISCONTINUED | OUTPATIENT
Start: 2019-09-04 | End: 2019-09-04 | Stop reason: SURG

## 2019-09-04 RX ORDER — PROMETHAZINE HYDROCHLORIDE 25 MG/ML
12.5 INJECTION, SOLUTION INTRAMUSCULAR; INTRAVENOUS ONCE AS NEEDED
Status: DISCONTINUED | OUTPATIENT
Start: 2019-09-04 | End: 2019-09-04 | Stop reason: HOSPADM

## 2019-09-04 RX ORDER — ONDANSETRON 2 MG/ML
4 INJECTION INTRAMUSCULAR; INTRAVENOUS ONCE AS NEEDED
Status: DISCONTINUED | OUTPATIENT
Start: 2019-09-04 | End: 2019-09-04 | Stop reason: HOSPADM

## 2019-09-04 RX ORDER — PROMETHAZINE HYDROCHLORIDE 25 MG/1
25 TABLET ORAL ONCE AS NEEDED
Status: DISCONTINUED | OUTPATIENT
Start: 2019-09-04 | End: 2019-09-04 | Stop reason: HOSPADM

## 2019-09-04 RX ORDER — MIDAZOLAM HYDROCHLORIDE 1 MG/ML
INJECTION INTRAMUSCULAR; INTRAVENOUS AS NEEDED
Status: DISCONTINUED | OUTPATIENT
Start: 2019-09-04 | End: 2019-09-04 | Stop reason: SURG

## 2019-09-04 RX ORDER — PROMETHAZINE HYDROCHLORIDE 25 MG/1
25 SUPPOSITORY RECTAL ONCE AS NEEDED
Status: DISCONTINUED | OUTPATIENT
Start: 2019-09-04 | End: 2019-09-04 | Stop reason: HOSPADM

## 2019-09-04 RX ORDER — PROPOFOL 10 MG/ML
VIAL (ML) INTRAVENOUS AS NEEDED
Status: DISCONTINUED | OUTPATIENT
Start: 2019-09-04 | End: 2019-09-04 | Stop reason: SURG

## 2019-09-04 RX ADMIN — PROPOFOL 50 MG: 10 INJECTION, EMULSION INTRAVENOUS at 16:49

## 2019-09-04 RX ADMIN — LIDOCAINE HYDROCHLORIDE 50 MG: 20 INJECTION, SOLUTION INTRAVENOUS at 16:45

## 2019-09-04 RX ADMIN — PROPOFOL 50 MG: 10 INJECTION, EMULSION INTRAVENOUS at 16:51

## 2019-09-04 RX ADMIN — PROPOFOL 50 MG: 10 INJECTION, EMULSION INTRAVENOUS at 16:53

## 2019-09-04 RX ADMIN — PROPOFOL 50 MG: 10 INJECTION, EMULSION INTRAVENOUS at 16:45

## 2019-09-04 RX ADMIN — PROPOFOL 50 MG: 10 INJECTION, EMULSION INTRAVENOUS at 16:47

## 2019-09-04 RX ADMIN — DEXTROSE AND SODIUM CHLORIDE 30 ML/HR: 5; 450 INJECTION, SOLUTION INTRAVENOUS at 15:36

## 2019-09-04 RX ADMIN — PROPOFOL 50 MG: 10 INJECTION, EMULSION INTRAVENOUS at 16:55

## 2019-09-04 RX ADMIN — MIDAZOLAM HYDROCHLORIDE 2 MG: 2 INJECTION, SOLUTION INTRAMUSCULAR; INTRAVENOUS at 16:45

## 2019-09-04 NOTE — INTERVAL H&P NOTE
H&P reviewed. The patient was examined and there are no changes to the H&P.      Risks and benefits discussed with patient patient understands these and would like to proceed with procedure.    
Breath sounds are clear, no distress present, no wheeze, rales, rhonchi or tachypnea. Normal rate and effort.

## 2019-09-04 NOTE — ANESTHESIA POSTPROCEDURE EVALUATION
Patient: Alpesh Small    Procedure Summary     Date:  09/04/19 Room / Location:  [facility] ENDOSCOPY 3 / [facility] ENDOSCOPY    Anesthesia Start:  1635 Anesthesia Stop:  1701    Procedures:       ESOPHAGOGASTRODUODENOSCOPY (N/A )      COLONOSCOPY (N/A ) Diagnosis:       Epigastric pain      Abnormal CT of the abdomen      (Epigastric pain [R10.13])      (Abnormal CT of the abdomen [R93.5])    Surgeon:  Alpesh Jackson MD Provider:  Diego Soliman CRNA    Anesthesia Type:  MAC ASA Status:  3          Anesthesia Type: MAC  Last vitals  BP   117/86 (09/04/19 1533)   Temp   97.6 °F (36.4 °C) (09/04/19 1533)   Pulse   71 (09/04/19 1533)   Resp   14 (09/04/19 1533)     SpO2   95 % (09/04/19 1533)     Post Anesthesia Care and Evaluation    Patient location during evaluation: bedside  Patient participation: complete - patient cannot participate  Level of consciousness: awake  Pain score: 0  Pain management: adequate  Airway patency: patent  Anesthetic complications: No anesthetic complications  PONV Status: none  Cardiovascular status: acceptable  Respiratory status: acceptable  Hydration status: acceptable

## 2019-09-06 DIAGNOSIS — I10 HYPERTENSION, UNSPECIFIED TYPE: ICD-10-CM

## 2019-09-06 LAB
LAB AP CASE REPORT: NORMAL
PATH REPORT.FINAL DX SPEC: NORMAL
PATH REPORT.GROSS SPEC: NORMAL

## 2019-09-19 ENCOUNTER — OFFICE VISIT (OUTPATIENT)
Dept: GASTROENTEROLOGY | Facility: CLINIC | Age: 39
End: 2019-09-19

## 2019-09-19 VITALS
WEIGHT: 148.8 LBS | HEIGHT: 68 IN | BODY MASS INDEX: 22.55 KG/M2 | OXYGEN SATURATION: 98 % | SYSTOLIC BLOOD PRESSURE: 118 MMHG | DIASTOLIC BLOOD PRESSURE: 80 MMHG | HEART RATE: 67 BPM

## 2019-09-19 DIAGNOSIS — R10.13 EPIGASTRIC PAIN: Primary | ICD-10-CM

## 2019-09-19 DIAGNOSIS — K21.9 GASTROESOPHAGEAL REFLUX DISEASE, ESOPHAGITIS PRESENCE NOT SPECIFIED: ICD-10-CM

## 2019-09-19 PROCEDURE — 99214 OFFICE O/P EST MOD 30 MIN: CPT | Performed by: INTERNAL MEDICINE

## 2019-09-19 RX ORDER — DEXLANSOPRAZOLE 60 MG/1
60 CAPSULE, DELAYED RELEASE ORAL DAILY
Qty: 30 CAPSULE | Refills: 5 | Status: SHIPPED | OUTPATIENT
Start: 2019-09-19 | End: 2021-07-26

## 2019-09-19 NOTE — PROGRESS NOTES
[facility]      Chief Complaint:   Chief Complaint   Patient presents with   • Abdominal Pain       Subjective     HPI:   Patient with epigastric abdominal pain EGD shows patient to have mild gastritis otherwise normal.  Patient does get episodes of diarrhea and constipation discussed with patient taking tablespoon of Benefiber daily.  Patient colonoscopy appeared normal.  Biopsies from colonoscopy were normal.    Plan; schedule patient for follow-up in 3 months we will start Dexilant 60 mg daily.  Will consider placing patient on medication for constipation    Past Medical History:   Past Medical History:   Diagnosis Date   • Congenital absence of one kidney    • Gastroesophageal reflux disease 9/19/2019   • SVT (supraventricular tachycardia) (CMS/[facility]) 12/2016       Past Surgical History:    Past Surgical History:   Procedure Laterality Date   • APPENDECTOMY     • COLONOSCOPY N/A 9/4/2019    Procedure: COLONOSCOPY;  Surgeon: Alpesh Jackson MD;  Location: [facility] ENDOSCOPY;  Service: Gastroenterology   • ENDOSCOPY N/A 9/4/2019    Procedure: ESOPHAGOGASTRODUODENOSCOPY;  Surgeon: Alpesh Jackson MD;  Location: [facility] ENDOSCOPY;  Service: Gastroenterology   • UPPER GASTROINTESTINAL ENDOSCOPY  09/04/2019       Family History:  Family History   Problem Relation Age of Onset   • Lung cancer Father    • Heart attack Father    • Stroke Father    • Diabetes Father    • Hypertension Father    • Hypertension Mother        Social History:   reports that he quit smoking about 10 months ago. His smoking use included electronic cigarette and cigarettes. He has a 9.00 pack-year smoking history. He has never used smokeless tobacco. He reports that he drinks alcohol. He reports that he does not use drugs.    Medications:   Prior to Admission medications    Medication Sig Start Date End Date Taking? Authorizing Provider   aspirin 81 MG chewable tablet Chew 81 mg Daily.   Yes Provider, MD Jake    busPIRone (BUSPAR) 5 MG tablet Take 1 tablet by mouth 3 (Three) Times a Day.  Patient taking differently: Take 5 mg by mouth 3 (Three) Times a Day As Needed. 6/7/19  Yes Vanita Espino APRN   metoprolol tartrate (LOPRESSOR) 25 MG tablet Take 1 tablet by mouth daily. NEED LABS AND APPT FOR FURTHER REFILLS 9/6/19  Yes Vanita Espino APRN   ondansetron ODT (ZOFRAN-ODT) 4 MG disintegrating tablet Take 1 tablet by mouth Every 6 (Six) Hours As Needed for Nausea or Vomiting. 8/10/19  Yes Nicholas Spivey MD   promethazine (PHENERGAN) 25 MG tablet Take 1 tablet by mouth Every 6 (Six) Hours As Needed for Nausea or Vomiting. 8/10/19  Yes Nicholas Spivey MD   clindamycin (CLEOCIN) 150 MG capsule Take 150 mg by mouth 4 (Four) Times a Day.    Provider, MD Jake   dexlansoprazole (DEXILANT) 60 MG capsule Take 1 capsule by mouth Daily. 9/19/19   Alpesh Jackson MD   HYDROcodone-acetaminophen (NORCO)  MG per tablet Take 1 tablet by mouth Every 6 (Six) Hours As Needed for Moderate Pain .    Provider, MD Jake       Allergies:  Codeine and Penicillins    ROS:    Review of Systems   Constitutional: Negative for activity change, appetite change and unexpected weight change.   HENT: Negative for congestion, sore throat and trouble swallowing.    Respiratory: Negative for cough, choking and shortness of breath.    Cardiovascular: Negative for chest pain.   Gastrointestinal: Positive for abdominal pain. Negative for abdominal distention, anal bleeding, blood in stool, constipation, diarrhea, nausea, rectal pain and vomiting.   Endocrine: Negative for heat intolerance, polydipsia and polyphagia.   Genitourinary: Negative for difficulty urinating.   Musculoskeletal: Negative for arthralgias.   Skin: Negative for color change, pallor, rash and wound.   Allergic/Immunologic: Negative for food allergies.   Neurological: Negative for dizziness, syncope, weakness and headaches.   Psychiatric/Behavioral:  "Negative for agitation, behavioral problems, confusion and decreased concentration.     Objective     Blood pressure 118/80, pulse 67, height 172.7 cm (68\"), weight 67.5 kg (148 lb 12.8 oz), SpO2 98 %.    Physical Exam   Constitutional: He is oriented to person, place, and time. He appears well-developed and well-nourished. No distress.   HENT:   Head: Normocephalic and atraumatic.   Cardiovascular: Normal rate, regular rhythm, normal heart sounds and intact distal pulses. Exam reveals no gallop and no friction rub.   No murmur heard.  Pulmonary/Chest: Breath sounds normal. No respiratory distress. He has no wheezes. He has no rales. He exhibits no tenderness.   Abdominal: Soft. Bowel sounds are normal. He exhibits no distension and no mass. There is no tenderness. There is no rebound and no guarding. No hernia.   Musculoskeletal: Normal range of motion. He exhibits no edema.   Neurological: He is alert and oriented to person, place, and time.   Skin: Skin is warm and dry. No rash noted. He is not diaphoretic. No erythema. No pallor.   Psychiatric: He has a normal mood and affect. His behavior is normal. Judgment and thought content normal.        Assessment/Plan   Alpesh was seen today for abdominal pain.    Diagnoses and all orders for this visit:    Epigastric pain    Gastroesophageal reflux disease, esophagitis presence not specified    Other orders  -     dexlansoprazole (DEXILANT) 60 MG capsule; Take 1 capsule by mouth Daily.        * Surgery not found *     Diagnosis Plan   1. Epigastric pain     2. Gastroesophageal reflux disease, esophagitis presence not specified         Anticipated Surgical Procedure:  No orders of the defined types were placed in this encounter.      The risks, benefits, and alternatives of this procedure have been discussed with the patient or the responsible party- the patient understands and agrees to proceed.                                                                "

## 2019-09-19 NOTE — PATIENT INSTRUCTIONS
Gastroesophageal Reflux Disease, Adult    Normally, food travels down the esophagus and stays in the stomach to be digested. If a person has gastroesophageal reflux disease (GERD), food and stomach acid move back up into the esophagus. When this happens, the esophagus becomes sore and swollen (inflamed). Over time, GERD can make small holes (ulcers) in the lining of the esophagus.  Follow these instructions at home:  Diet  · Follow a diet as told by your doctor. You may need to avoid foods and drinks such as:  ? Coffee and tea (with or without caffeine).  ? Drinks that contain alcohol.  ? Energy drinks and sports drinks.  ? Carbonated drinks or sodas.  ? Chocolate and cocoa.  ? Peppermint and mint flavorings.  ? Garlic and onions.  ? Horseradish.  ? Spicy and acidic foods, such as peppers, chili powder, francis powder, vinegar, hot sauces, and BBQ sauce.  ? Citrus fruit juices and citrus fruits, such as oranges, varghese, and limes.  ? Tomato-based foods, such as red sauce, chili, salsa, and pizza with red sauce.  ? Fried and fatty foods, such as donuts, french fries, potato chips, and high-fat dressings.  ? High-fat meats, such as hot dogs, rib eye steak, sausage, ham, and lubin.  ? High-fat dairy items, such as whole milk, butter, and cream cheese.  · Eat small meals often. Avoid eating large meals.  · Avoid drinking large amounts of liquid with your meals.  · Avoid eating meals during the 2-3 hours before bedtime.  · Avoid lying down right after you eat.  · Do not exercise right after you eat.  General instructions  · Pay attention to any changes in your symptoms.  · Take over-the-counter and prescription medicines only as told by your doctor. Do not take aspirin, ibuprofen, or other NSAIDs unless your doctor says it is okay.  · Do not use any tobacco products, including cigarettes, chewing tobacco, and e-cigarettes. If you need help quitting, ask your doctor.  · Wear loose clothes. Do not wear anything tight around  your waist.  · Raise (elevate) the head of your bed about 6 inches (15 cm).  · Try to lower your stress. If you need help doing this, ask your doctor.  · If you are overweight, lose an amount of weight that is healthy for you. Ask your doctor about a safe weight loss goal.  · Keep all follow-up visits as told by your doctor. This is important.  Contact a doctor if:  · You have new symptoms.  · You lose weight and you do not know why it is happening.  · You have trouble swallowing, or it hurts to swallow.  · You have wheezing or a cough that keeps happening.  · Your symptoms do not get better with treatment.  · You have a hoarse voice.  Get help right away if:  · You have pain in your arms, neck, jaw, teeth, or back.  · You feel sweaty, dizzy, or light-headed.  · You have chest pain or shortness of breath.  · You throw up (vomit) and your throw up looks like blood or coffee grounds.  · You pass out (faint).  · Your poop (stool) is bloody or black.  · You cannot swallow, drink, or eat.  This information is not intended to replace advice given to you by your health care provider. Make sure you discuss any questions you have with your health care provider.  Document Released: 06/05/2009 Document Revised: 10/30/2018 Document Reviewed: 04/13/2016  [employer] Interactive Patient Education © 2019 [employer].

## 2019-09-25 ENCOUNTER — TELEPHONE (OUTPATIENT)
Dept: GASTROENTEROLOGY | Facility: CLINIC | Age: 39
End: 2019-09-25

## 2019-09-25 NOTE — TELEPHONE ENCOUNTER
----- Message from Alise Ford sent at 9/25/2019  9:07 AM CDT -----  Patient's wife called and left a voicemail in regards to the acid reflux medication that was prescribed. The pharmacy told them that it is needing a PA and she was wanting to know if someone was working on that. Please call back at [phone]. Thanks!

## 2019-09-25 NOTE — TELEPHONE ENCOUNTER
09/25/2019, 1658 - Patient telephone call returned per this staff member [phone].  Patient made aware this staff member has initiated Prior Authorization for Dexilant 60 MG Capsules, however, Prior Authorization could not be submitted electronically per Cover My Meds and this staff member is currently awaiting Prior Authorization forms to be submitted via facsimile.  Patient verbalized understanding and stated he has utilized Over-The-Counter Zantac and Omeprazole.

## 2019-10-08 DIAGNOSIS — I10 HYPERTENSION, UNSPECIFIED TYPE: ICD-10-CM

## 2019-10-10 ENCOUNTER — TELEPHONE (OUTPATIENT)
Dept: GASTROENTEROLOGY | Facility: CLINIC | Age: 39
End: 2019-10-10

## 2019-10-10 NOTE — TELEPHONE ENCOUNTER
10/10/2019, 0801 - Patient telephoned per this staff member [phone].  Zero answer.  Voice message submitted with date, time, office contact information, and notification facsimile received per [facility] regarding Prior Authorization approval for prescription medication.  Patient instructed to contact office with questions.    10/10/2019, 0808 - Patient's pharmacy of [facility], [facility], [place], [place], telephoned per this staff member [phone] and made aware of prescription medication Prior Authorization approval regarding Dexilant 60 MG Capsules, 1 capsule by mouth daily as ordered 09/19/2019 per Dr. Alpesh Segal M.D. with Prior Authorization approval effective 10/07/2019 - 12/31/2099.  Pharmacy personnel stated patient co-pay $100.00.  Patient noted to have commercial insurance, therefore, this staff member will submit instant savings card to pharmacy via facsimile [phone].  Pharmacy personnel verbalized understanding.    Note - Prescription medication Dexilant 60 MG Capsules, 1 capsule by mouth daily as ordered 09/19/2019 per Dr. Alpesh Segal M.D.  Prior Authorization approval effective 10/07/2019 - 12/31/2099.

## 2019-10-14 ENCOUNTER — OFFICE VISIT (OUTPATIENT)
Dept: FAMILY MEDICINE CLINIC | Facility: CLINIC | Age: 39
End: 2019-10-14

## 2019-10-14 VITALS
BODY MASS INDEX: 22.58 KG/M2 | HEART RATE: 73 BPM | DIASTOLIC BLOOD PRESSURE: 64 MMHG | HEIGHT: 68 IN | SYSTOLIC BLOOD PRESSURE: 110 MMHG | WEIGHT: 149 LBS | OXYGEN SATURATION: 97 %

## 2019-10-14 DIAGNOSIS — R73.9 HYPERGLYCEMIA: ICD-10-CM

## 2019-10-14 DIAGNOSIS — L81.9 HYPERPIGMENTATION: ICD-10-CM

## 2019-10-14 DIAGNOSIS — I10 ESSENTIAL HYPERTENSION: ICD-10-CM

## 2019-10-14 DIAGNOSIS — K21.9 GASTROESOPHAGEAL REFLUX DISEASE, ESOPHAGITIS PRESENCE NOT SPECIFIED: Primary | ICD-10-CM

## 2019-10-14 PROCEDURE — 99213 OFFICE O/P EST LOW 20 MIN: CPT | Performed by: NURSE PRACTITIONER

## 2019-10-15 PROBLEM — L81.9 HYPERPIGMENTATION: Status: ACTIVE | Noted: 2019-10-15

## 2019-10-15 PROBLEM — R73.9 HYPERGLYCEMIA: Status: ACTIVE | Noted: 2019-10-15

## 2019-10-15 RX ORDER — HYDROQUINONE 40 MG/G
CREAM TOPICAL 2 TIMES DAILY
Qty: 28.35 G | Refills: 0 | Status: SHIPPED | OUTPATIENT
Start: 2019-10-15 | End: 2020-08-10

## 2019-10-15 NOTE — PROGRESS NOTES
Subjective   Alpesh Small is a 39 y.o. male who presents to the office for HTN follow-up.  Will need a refill of his Metoprolol as well as update fasting labs.    Would like topical agent to help with discoloration on his forehead.  Refusing flu vaccine.  History of Present Illness     The following portions of the patient's history were reviewed and updated as appropriate: allergies, current medications, past family history, past medical history, past social history, past surgical history and problem list.    Review of Systems   Constitutional: Negative for chills, fatigue and fever.   HENT: Negative for congestion, sneezing, sore throat and trouble swallowing.    Eyes: Negative for visual disturbance.   Respiratory: Negative for cough, chest tightness, shortness of breath and wheezing.    Cardiovascular: Negative for chest pain, palpitations and leg swelling.   Gastrointestinal: Negative for abdominal pain, constipation, diarrhea, nausea and vomiting.   Genitourinary: Negative for dysuria, frequency and urgency.   Musculoskeletal: Negative for neck pain.   Skin: Positive for color change. Negative for rash.   Neurological: Negative for dizziness, weakness and headaches.   Psychiatric/Behavioral:        In the past two weeks the pt has not felt down, depressed, hopeless or lost interest in doing things   All other systems reviewed and are negative.      Objective   Physical Exam   Constitutional: He is oriented to person, place, and time. He appears well-developed and well-nourished. He is cooperative.  Non-toxic appearance. No distress.   HENT:   Head: Normocephalic and atraumatic.   Right Ear: External ear normal.   Left Ear: External ear normal.   Nose: Nose normal.   Mouth/Throat: Oropharynx is clear and moist.   Eyes: Conjunctivae and EOM are normal. Pupils are equal, round, and reactive to light. Right eye exhibits no discharge. Left eye exhibits no discharge. No scleral icterus.   Neck: Normal range of  motion. Neck supple. No thyromegaly present.   Cardiovascular: Normal rate, regular rhythm, normal heart sounds and intact distal pulses. Exam reveals no gallop and no friction rub.   No murmur heard.  Pulmonary/Chest: Effort normal and breath sounds normal. No respiratory distress. He has no wheezes. He has no rales.   Abdominal: Soft. Bowel sounds are normal. He exhibits no distension. There is no tenderness. There is no rebound and no guarding.   Musculoskeletal: Normal range of motion. He exhibits no edema.   Lymphadenopathy:     He has no cervical adenopathy.   Neurological: He is alert and oriented to person, place, and time. No cranial nerve deficit. GCS eye subscore is 4. GCS verbal subscore is 5. GCS motor subscore is 6.   Skin: Skin is warm, dry and intact. Capillary refill takes less than 2 seconds. Rash (hyperpigmentation noted to forehead) noted.   Psychiatric: He has a normal mood and affect. His behavior is normal. Judgment and thought content normal.   Nursing note and vitals reviewed.      Assessment/Plan   Alpesh was seen today for annual exam.    Diagnoses and all orders for this visit:    Gastroesophageal reflux disease, esophagitis presence not specified    Essential hypertension  -     metoprolol tartrate (LOPRESSOR) 25 MG tablet; Take 1 tablet by mouth daily.  -     Comprehensive Metabolic Panel; Future  -     CBC & Differential; Future  -     T4, Free; Future  -     TSH; Future  -     Urinalysis With Culture If Indicated -; Future  -     Lipid Panel; Future    Hyperglycemia  -     Hemoglobin A1c; Future    Hyperpigmentation    Other orders  -     hydroquinone 4 % cream; Apply  topically to the appropriate area as directed 2 (Two) Times a Day.    Encouraged meds as ordered.  Use sunscreen daily with above topical agent.    Encouraged flu vaccine.

## 2020-01-17 ENCOUNTER — HOSPITAL ENCOUNTER (EMERGENCY)
Facility: HOSPITAL | Age: 40
Discharge: HOME | End: 2020-01-18
Attending: EMERGENCY MEDICINE | Admitting: EMERGENCY MEDICINE

## 2020-01-17 ENCOUNTER — APPOINTMENT (OUTPATIENT)
Dept: GENERAL RADIOLOGY | Facility: HOSPITAL | Age: 40
End: 2020-01-17

## 2020-01-17 DIAGNOSIS — S29.019A THORACIC MYOFASCIAL STRAIN, INITIAL ENCOUNTER: ICD-10-CM

## 2020-01-17 DIAGNOSIS — R07.89 ATYPICAL CHEST PAIN: Primary | ICD-10-CM

## 2020-01-17 LAB
ALBUMIN SERPL-MCNC: 4.3 G/DL (ref 3.5–5.2)
ALBUMIN/GLOB SERPL: 1.6 G/DL
ALP SERPL-CCNC: 125 U/L (ref 39–117)
ALT SERPL W P-5'-P-CCNC: 19 U/L (ref 1–41)
ANION GAP SERPL CALCULATED.3IONS-SCNC: 13 MMOL/L (ref 5–15)
APTT PPP: 24.9 SECONDS (ref 20–40.3)
AST SERPL-CCNC: 16 U/L (ref 1–40)
BASOPHILS # BLD AUTO: 0.06 10*3/MM3 (ref 0–0.2)
BASOPHILS NFR BLD AUTO: 0.8 % (ref 0–1.5)
BILIRUB SERPL-MCNC: 0.5 MG/DL (ref 0.2–1.2)
BUN BLD-MCNC: 15 MG/DL (ref 6–20)
BUN/CREAT SERPL: 13 (ref 7–25)
CALCIUM SPEC-SCNC: 9.1 MG/DL (ref 8.6–10.5)
CHLORIDE SERPL-SCNC: 103 MMOL/L (ref 98–107)
CO2 SERPL-SCNC: 27 MMOL/L (ref 22–29)
CREAT BLD-MCNC: 1.15 MG/DL (ref 0.76–1.27)
D-DIMER, QUANTITATIVE (MAD,POW, STR): <270 NG/ML (FEU) (ref 0–470)
DEPRECATED RDW RBC AUTO: 40.9 FL (ref 37–54)
EOSINOPHIL # BLD AUTO: 0.59 10*3/MM3 (ref 0–0.4)
EOSINOPHIL NFR BLD AUTO: 8 % (ref 0.3–6.2)
ERYTHROCYTE [DISTWIDTH] IN BLOOD BY AUTOMATED COUNT: 13.1 % (ref 12.3–15.4)
GFR SERPL CREATININE-BSD FRML MDRD: 71 ML/MIN/1.73
GLOBULIN UR ELPH-MCNC: 2.7 GM/DL
GLUCOSE BLD-MCNC: 101 MG/DL (ref 65–99)
HCT VFR BLD AUTO: 45.3 % (ref 37.5–51)
HGB BLD-MCNC: 15.3 G/DL (ref 13–17.7)
HOLD SPECIMEN: NORMAL
HOLD SPECIMEN: NORMAL
IMM GRANULOCYTES # BLD AUTO: 0.06 10*3/MM3 (ref 0–0.05)
IMM GRANULOCYTES NFR BLD AUTO: 0.8 % (ref 0–0.5)
INR PPP: 0.89 (ref 0.8–1.2)
LYMPHOCYTES # BLD AUTO: 2.09 10*3/MM3 (ref 0.7–3.1)
LYMPHOCYTES NFR BLD AUTO: 28.4 % (ref 19.6–45.3)
MCH RBC QN AUTO: 29.2 PG (ref 26.6–33)
MCHC RBC AUTO-ENTMCNC: 33.8 G/DL (ref 31.5–35.7)
MCV RBC AUTO: 86.5 FL (ref 79–97)
MONOCYTES # BLD AUTO: 1.06 10*3/MM3 (ref 0.1–0.9)
MONOCYTES NFR BLD AUTO: 14.4 % (ref 5–12)
NEUTROPHILS # BLD AUTO: 3.49 10*3/MM3 (ref 1.7–7)
NEUTROPHILS NFR BLD AUTO: 47.6 % (ref 42.7–76)
NRBC BLD AUTO-RTO: 0 /100 WBC (ref 0–0.2)
NT-PROBNP SERPL-MCNC: 34.2 PG/ML (ref 5–450)
PLATELET # BLD AUTO: 237 10*3/MM3 (ref 140–450)
PMV BLD AUTO: 9.6 FL (ref 6–12)
POTASSIUM BLD-SCNC: 4.1 MMOL/L (ref 3.5–5.2)
PROT SERPL-MCNC: 7 G/DL (ref 6–8.5)
PROTHROMBIN TIME: 11.9 SECONDS (ref 11.1–15.3)
RBC # BLD AUTO: 5.24 10*6/MM3 (ref 4.14–5.8)
SODIUM BLD-SCNC: 143 MMOL/L (ref 136–145)
TROPONIN T SERPL-MCNC: <0.01 NG/ML (ref 0–0.03)
WBC NRBC COR # BLD: 7.35 10*3/MM3 (ref 3.4–10.8)
WHOLE BLOOD HOLD SPECIMEN: NORMAL
WHOLE BLOOD HOLD SPECIMEN: NORMAL

## 2020-01-17 PROCEDURE — 83880 ASSAY OF NATRIURETIC PEPTIDE: CPT

## 2020-01-17 PROCEDURE — 72072 X-RAY EXAM THORAC SPINE 3VWS: CPT

## 2020-01-17 PROCEDURE — 85730 THROMBOPLASTIN TIME PARTIAL: CPT | Performed by: EMERGENCY MEDICINE

## 2020-01-17 PROCEDURE — 85610 PROTHROMBIN TIME: CPT | Performed by: EMERGENCY MEDICINE

## 2020-01-17 PROCEDURE — 96375 TX/PRO/DX INJ NEW DRUG ADDON: CPT

## 2020-01-17 PROCEDURE — 85025 COMPLETE CBC W/AUTO DIFF WBC: CPT

## 2020-01-17 PROCEDURE — 85379 FIBRIN DEGRADATION QUANT: CPT | Performed by: EMERGENCY MEDICINE

## 2020-01-17 PROCEDURE — 25010000002 KETOROLAC TROMETHAMINE PER 15 MG: Performed by: EMERGENCY MEDICINE

## 2020-01-17 PROCEDURE — 99284 EMERGENCY DEPT VISIT MOD MDM: CPT

## 2020-01-17 PROCEDURE — 93005 ELECTROCARDIOGRAM TRACING: CPT

## 2020-01-17 PROCEDURE — 96374 THER/PROPH/DIAG INJ IV PUSH: CPT

## 2020-01-17 PROCEDURE — 71045 X-RAY EXAM CHEST 1 VIEW: CPT

## 2020-01-17 PROCEDURE — 93010 ELECTROCARDIOGRAM REPORT: CPT | Performed by: INTERNAL MEDICINE

## 2020-01-17 PROCEDURE — 93005 ELECTROCARDIOGRAM TRACING: CPT | Performed by: EMERGENCY MEDICINE

## 2020-01-17 PROCEDURE — 80053 COMPREHEN METABOLIC PANEL: CPT | Performed by: EMERGENCY MEDICINE

## 2020-01-17 PROCEDURE — 96361 HYDRATE IV INFUSION ADD-ON: CPT

## 2020-01-17 PROCEDURE — 84484 ASSAY OF TROPONIN QUANT: CPT | Performed by: EMERGENCY MEDICINE

## 2020-01-17 RX ORDER — FAMOTIDINE 10 MG/ML
20 INJECTION, SOLUTION INTRAVENOUS ONCE
Status: COMPLETED | OUTPATIENT
Start: 2020-01-17 | End: 2020-01-17

## 2020-01-17 RX ORDER — DIAZEPAM 5 MG/1
5 TABLET ORAL ONCE
Status: COMPLETED | OUTPATIENT
Start: 2020-01-17 | End: 2020-01-17

## 2020-01-17 RX ORDER — ORPHENADRINE CITRATE 100 MG/1
100 TABLET, EXTENDED RELEASE ORAL 2 TIMES DAILY PRN
Qty: 20 TABLET | Refills: 0 | Status: SHIPPED | OUTPATIENT
Start: 2020-01-17 | End: 2020-08-10

## 2020-01-17 RX ORDER — SODIUM CHLORIDE 0.9 % (FLUSH) 0.9 %
10 SYRINGE (ML) INJECTION AS NEEDED
Status: DISCONTINUED | OUTPATIENT
Start: 2020-01-17 | End: 2020-01-18 | Stop reason: HOSPADM

## 2020-01-17 RX ORDER — NITROGLYCERIN 0.4 MG/1
0.4 TABLET SUBLINGUAL
Status: DISCONTINUED | OUTPATIENT
Start: 2020-01-17 | End: 2020-01-18 | Stop reason: HOSPADM

## 2020-01-17 RX ORDER — SODIUM CHLORIDE 9 MG/ML
125 INJECTION, SOLUTION INTRAVENOUS CONTINUOUS
Status: DISCONTINUED | OUTPATIENT
Start: 2020-01-17 | End: 2020-01-18 | Stop reason: HOSPADM

## 2020-01-17 RX ORDER — KETOROLAC TROMETHAMINE 30 MG/ML
30 INJECTION, SOLUTION INTRAMUSCULAR; INTRAVENOUS ONCE
Status: COMPLETED | OUTPATIENT
Start: 2020-01-17 | End: 2020-01-17

## 2020-01-17 RX ORDER — ASPIRIN 325 MG
325 TABLET ORAL ONCE
Status: DISCONTINUED | OUTPATIENT
Start: 2020-01-17 | End: 2020-01-18 | Stop reason: HOSPADM

## 2020-01-17 RX ADMIN — DIAZEPAM 5 MG: 5 TABLET ORAL at 22:26

## 2020-01-17 RX ADMIN — FAMOTIDINE 20 MG: 10 INJECTION INTRAVENOUS at 22:26

## 2020-01-17 RX ADMIN — SODIUM CHLORIDE 125 ML/HR: 900 INJECTION, SOLUTION INTRAVENOUS at 22:25

## 2020-01-17 RX ADMIN — KETOROLAC TROMETHAMINE 30 MG: 30 INJECTION, SOLUTION INTRAMUSCULAR at 23:13

## 2020-01-17 RX ADMIN — Medication 10 ML: at 21:17

## 2020-01-18 VITALS
SYSTOLIC BLOOD PRESSURE: 112 MMHG | HEART RATE: 77 BPM | OXYGEN SATURATION: 97 % | TEMPERATURE: 97.2 F | RESPIRATION RATE: 20 BRPM | DIASTOLIC BLOOD PRESSURE: 72 MMHG | HEIGHT: 68 IN | WEIGHT: 155.5 LBS | BODY MASS INDEX: 23.57 KG/M2

## 2020-01-18 NOTE — ED NOTES
C/o of mid-upper back pain, and chest pain since Monday. Patient seen chioroprater today thinking it might just be his back. Also c/o of worsening pain with breathing.      Arianna Sorto RN  01/17/20 1945

## 2020-01-18 NOTE — ED PROVIDER NOTES
"Subjective   40yo male pmh significant svt presents ED c/o 2d hx upper thoracic back discomfort which he attributed to possible back strain, seen by chiropractor earlier today for same with 1d onset substernal chest \"pressure\"/nonradiating/exac inspiration/neg relieve factors/neg associated symptoms.  ROS neg fever/chills/cough/hemoptysis/abd pain/syncope/palpitations.  Denies headache/neck pain.      History provided by:  Patient  Chest Pain   Pain location:  Substernal area  Pain quality: pressure    Pain radiates to:  Upper back  Duration:  1 day  Associated symptoms: back pain    Associated symptoms: no cough, no dizziness, no palpitations and no shortness of breath        Review of Systems   Constitutional: Negative.    HENT: Negative.    Respiratory: Negative.  Negative for cough and shortness of breath.    Cardiovascular: Positive for chest pain. Negative for palpitations and leg swelling.   Gastrointestinal: Negative.    Genitourinary: Negative.    Musculoskeletal: Positive for back pain.   Skin: Negative.    Neurological: Negative for dizziness and syncope.   All other systems reviewed and are negative.      Past Medical History:   Diagnosis Date   • Congenital absence of one kidney    • Gastroesophageal reflux disease 9/19/2019   • Hypertension 10/14/2019   • SVT (supraventricular tachycardia) (CMS/[facility]) 12/2016       Allergies   Allergen Reactions   • Codeine Hives   • Penicillins Hives       Past Surgical History:   Procedure Laterality Date   • APPENDECTOMY     • COLONOSCOPY N/A 9/4/2019    Procedure: COLONOSCOPY;  Surgeon: Alepsh Jackson MD;  Location: [facility] ENDOSCOPY;  Service: Gastroenterology   • ENDOSCOPY N/A 9/4/2019    Procedure: ESOPHAGOGASTRODUODENOSCOPY;  Surgeon: Alpesh Jackson MD;  Location: [facility] ENDOSCOPY;  Service: Gastroenterology   • UPPER GASTROINTESTINAL ENDOSCOPY  09/04/2019       Family History   Problem Relation Age of Onset   • Lung cancer Father    • Heart attack Father    • " "Stroke Father    • Diabetes Father    • Hypertension Father    • Hypertension Mother        Social History     Socioeconomic History   • Marital status:      Spouse name: Beulah   • Number of children: 1   • Years of education: Not on file   • Highest education level: Not on file   Occupational History   • Occupation: Panel      Employer: [employer]     Comment: Patient resides with wife   Tobacco Use   • Smoking status: Former Smoker     Packs/day: 1.00     Years: 9.00     Pack years: 9.00     Types: Electronic Cigarette, Cigarettes     Last attempt to quit: 2018     Years since quittin.1   • Smokeless tobacco: Never Used   Substance and Sexual Activity   • Alcohol use: Yes     Comment: 2019 - Patinet states he consumes an alcoholic beverage \"every now and then\".   • Drug use: No     Types: Marijuana, Methamphetamines     Comment: 2019 - Patient states he has a past history of Marijuana and Methamphetamines.   • Sexual activity: Defer           Objective   Physical Exam   Constitutional: He appears well-developed and well-nourished.   HENT:   Head: Normocephalic and atraumatic.   Nose: Nose normal.   Mouth/Throat: Oropharynx is clear and moist.   Eyes: Pupils are equal, round, and reactive to light.   Neck: Trachea normal and normal range of motion. No JVD present. No spinous process tenderness and no muscular tenderness present. Neck rigidity present. No tracheal deviation present. No Brudzinski's sign and no Kernig's sign noted.   Cardiovascular: Normal rate, regular rhythm, normal heart sounds and intact distal pulses. Exam reveals no gallop and no friction rub.   No murmur heard.  Pulmonary/Chest: Effort normal and breath sounds normal. He has no wheezes. He has no rales.   Abdominal: Soft. Bowel sounds are normal. He exhibits no mass. There is no tenderness. There is no guarding.   Musculoskeletal: He exhibits no edema.        Thoracic back: He exhibits tenderness, pain and " spasm. He exhibits no bony tenderness, no swelling, no edema and no deformity.        Back:    Lymphadenopathy:     He has no cervical adenopathy.   Nursing note and vitals reviewed.      ECG 12 Lead    Date/Time: 1/17/2020 11:04 PM  Performed by: Kenyon Hamilton MD  Authorized by: Kenyon Hamilton MD   Interpreted by physician  Rhythm: sinus rhythm  Rate: normal  BPM: 69  QRS axis: normal  Conduction: conduction normal  ST Segments: ST segments normal  T Waves: T waves normal  Other: no other findings  Clinical impression: normal ECG                 ED Course      Labs Reviewed   COMPREHENSIVE METABOLIC PANEL - Abnormal; Notable for the following components:       Result Value    Glucose 101 (*)     Alkaline Phosphatase 125 (*)     All other components within normal limits    Narrative:     GFR Normal >60  Chronic Kidney Disease <60  Kidney Failure <15     CBC WITH AUTO DIFFERENTIAL - Abnormal; Notable for the following components:    Monocyte % 14.4 (*)     Eosinophil % 8.0 (*)     Immature Grans % 0.8 (*)     Monocytes, Absolute 1.06 (*)     Eosinophils, Absolute 0.59 (*)     Immature Grans, Absolute 0.06 (*)     All other components within normal limits   TROPONIN (IN-HOUSE) - Normal    Narrative:     Troponin T Reference Range:  <= 0.03 ng/mL-   Negative for AMI  >0.03 ng/mL-     Abnormal for myocardial necrosis.  Clinicians would have to utilize clinical acumen, EKG, Troponin and serial changes to determine if it is an Acute Myocardial Infarction or myocardial injury due to an underlying chronic condition.       Results may be falsely decreased if patient taking Biotin.     BNP (IN-HOUSE) - Normal    Narrative:     Among patients with dyspnea, NT-proBNP is highly sensitive for the detection of acute congestive heart failure. In addition NT-proBNP of <300 pg/ml effectively rules out acute congestive heart failure with 99% negative predictive value.    Results may be falsely decreased if patient taking Biotin.       APTT - Normal    Narrative:     The recommended Heparin therapeutic range is 68-97 seconds.   PROTIME-INR - Normal    Narrative:     Therapeutic range for most indications is 2.0-3.0 INR,  or 2.5-3.5 for mechanical heart valves.   D-DIMER, QUANTITATIVE - Normal    Narrative:     Dimer values <500 ng/ml FEU are FDA approved as aid in diagnosis of deep venous thrombosis and pulmonary embolism.  This test should not be used in an exclusion strategy with pretest probability alone.    A recent guideline regarding diagnosis for pulmonary thromboembolism recommends an adjusted exclusion criterion of age x 10 ng/ml FEU for patients >50 years of age ([employer] Intern Med 2015; 163: 701-711).     RAINBOW DRAW    Narrative:     The following orders were created for panel order [place] Draw.  Procedure                               Abnormality         Status                     ---------                               -----------         ------                     Light Blue Top[[id number]]                                   Final result               Green Top (Gel)[[id number]]                                  Final result               Lavender Top[[id number]]                                     Final result               Gold Top - SST[[id number]]                                   Final result                 Please view results for these tests on the individual orders.   TROPONIN (IN-HOUSE)   TROPONIN (IN-HOUSE)   CBC AND DIFFERENTIAL    Narrative:     The following orders were created for panel order CBC & Differential.  Procedure                               Abnormality         Status                     ---------                               -----------         ------                     CBC Auto Differential[[id number]]        Abnormal            Final result                 Please view results for these tests on the individual orders.   LIGHT BLUE TOP   GREEN TOP   LAVENDER TOP   GOLD TOP - SST     Xr Spine Thoracic 3  View    Result Date: 1/17/2020  Narrative: EXAM:   XR Thoracic Spine, 3 Views CLINICAL HISTORY:   The patient is 39 years old and is Male; back pain, R07.89 Other chest pain S29.019A Strain of muscle and tendon of unspecified wall of thorax, initial encounter TECHNIQUE:   Frontal, lateral and swimmer's views of the thoracic spine. COMPARISON:   No relevant prior studies available. FINDINGS:   VERTEBRAE:  Unremarkable.  No acute fracture.  Normal alignment.   DISC SPACES:  No acute findings.  No significant narrowing.   SOFT TISSUES:  Unremarkable.     Impression:   Normal thoracic spine radiographs. Electronically signed by:  Anne Marie Cline MD  1/17/2020 11:49 PM CST Workstation: [id number]    Xr Chest 1 View    Result Date: 1/17/2020  Narrative: EXAM DESCRIPTION: XR CHEST 1 VW  CLINICAL HISTORY: Chest Pain Triage Protocol TECHNIQUE: Single frontal view of the chest is submitted. COMPARISON: 8/9/2019 FINDINGS: Heart: The cardiothoracic silhouette is within normal limits. Lungs: No focal consolidation. Mediastinum: Unremarkable Pleura: No appreciable effusion. No pneumothorax. Bones: Intact Upper abdomen: Unremarkable     Impression: No acute disease. Electronically signed by:  Joyce Forte MD  1/17/2020 10:13 PM CST Workstation: [id number]                                           HEART Score (for prediction of 6-week risk of major adverse cardiac event) reviewed and/or performed as part of the patient evaluation and treatment planning process.  The result associated with this review/performance is: 1       MDM  Number of Diagnoses or Management Options  Atypical chest pain: new and requires workup  Thoracic myofascial strain, initial encounter: new and requires workup  Diagnosis management comments: Patient with atypical continuous chest pain x1d duration (>8hrs duration) with negative ekg for ischemia. [place] negative x1. HEART score =1 (low risk). D dimer negative.  cxr no active disease.  Pt demonstrates objective  thoracic spine paraspinous muscular spasm with nonfocal midline tenderness T4-6. No pain percussion c/t/l spine. Labs unremarkable. Stable discharge w/pmd et cardiology followup.  Precautions provided.       Amount and/or Complexity of Data Reviewed  Clinical lab tests: reviewed  Tests in the radiology section of CPT®: reviewed  Tests in the medicine section of CPT®: reviewed        Final diagnoses:   Atypical chest pain   Thoracic myofascial strain, initial encounter            Kenyon Hamilton MD  01/17/20 3050       Kenyon Hamilton MD  01/17/20 [id number]

## 2020-01-18 NOTE — DISCHARGE INSTRUCTIONS
Return ED chest pain, shortness of air, syncope, palpitations, headache, fever, chills, worse condition, other concerns

## 2020-01-31 ENCOUNTER — LAB (OUTPATIENT)
Dept: LAB | Facility: OTHER | Age: 40
End: 2020-01-31

## 2020-01-31 ENCOUNTER — OFFICE VISIT (OUTPATIENT)
Dept: FAMILY MEDICINE CLINIC | Facility: CLINIC | Age: 40
End: 2020-01-31

## 2020-01-31 VITALS
OXYGEN SATURATION: 96 % | SYSTOLIC BLOOD PRESSURE: 96 MMHG | WEIGHT: 156 LBS | DIASTOLIC BLOOD PRESSURE: 64 MMHG | HEIGHT: 68 IN | BODY MASS INDEX: 23.64 KG/M2 | HEART RATE: 68 BPM

## 2020-01-31 DIAGNOSIS — R73.9 HYPERGLYCEMIA: ICD-10-CM

## 2020-01-31 DIAGNOSIS — I10 ESSENTIAL HYPERTENSION: ICD-10-CM

## 2020-01-31 DIAGNOSIS — M54.2 NECK PAIN: Primary | ICD-10-CM

## 2020-01-31 LAB
ALBUMIN SERPL-MCNC: 4.4 G/DL (ref 3.5–5)
ALBUMIN/GLOB SERPL: 1.2 G/DL (ref 1.1–1.8)
ALP SERPL-CCNC: 123 U/L (ref 38–126)
ALT SERPL W P-5'-P-CCNC: 31 U/L
ANION GAP SERPL CALCULATED.3IONS-SCNC: 8 MMOL/L (ref 5–15)
AST SERPL-CCNC: 25 U/L (ref 17–59)
BASOPHILS # BLD MANUAL: 0.06 10*3/MM3 (ref 0–0.2)
BASOPHILS NFR BLD AUTO: 1 % (ref 0–1.5)
BILIRUB SERPL-MCNC: 0.7 MG/DL (ref 0.2–1.3)
BILIRUB UR QL STRIP: NEGATIVE
BUN BLD-MCNC: 17 MG/DL (ref 7–23)
BUN/CREAT SERPL: 15.3 (ref 7–25)
CALCIUM SPEC-SCNC: 9.5 MG/DL (ref 8.4–10.2)
CHLORIDE SERPL-SCNC: 106 MMOL/L (ref 101–112)
CHOLEST SERPL-MCNC: 209 MG/DL (ref 150–200)
CLARITY UR: CLEAR
CO2 SERPL-SCNC: 27 MMOL/L (ref 22–30)
COLOR UR: YELLOW
CREAT BLD-MCNC: 1.11 MG/DL (ref 0.7–1.3)
DEPRECATED RDW RBC AUTO: 43.2 FL (ref 37–54)
EOSINOPHIL # BLD MANUAL: 0.31 10*3/MM3 (ref 0–0.4)
EOSINOPHIL NFR BLD MANUAL: 5 % (ref 0.3–6.2)
ERYTHROCYTE [DISTWIDTH] IN BLOOD BY AUTOMATED COUNT: 13.5 % (ref 12.3–15.4)
GFR SERPL CREATININE-BSD FRML MDRD: 74 ML/MIN/1.73 (ref 70–162)
GLOBULIN UR ELPH-MCNC: 3.6 GM/DL (ref 2.3–3.5)
GLUCOSE BLD-MCNC: 96 MG/DL (ref 70–99)
GLUCOSE UR STRIP-MCNC: NEGATIVE MG/DL
HBA1C MFR BLD: 5.5 % (ref 4.8–5.6)
HCT VFR BLD AUTO: 48.5 % (ref 37.5–51)
HDLC SERPL-MCNC: 45 MG/DL (ref 40–59)
HGB BLD-MCNC: 16.2 G/DL (ref 13–17.7)
HGB UR QL STRIP.AUTO: NEGATIVE
KETONES UR QL STRIP: NEGATIVE
LARGE PLATELETS: NORMAL
LDLC SERPL CALC-MCNC: 145 MG/DL
LDLC/HDLC SERPL: 3.22 {RATIO} (ref 0–3.55)
LEUKOCYTE ESTERASE UR QL STRIP.AUTO: NEGATIVE
LYMPHOCYTES # BLD MANUAL: 1.48 10*3/MM3 (ref 0.7–3.1)
LYMPHOCYTES NFR BLD MANUAL: 10 % (ref 5–12)
LYMPHOCYTES NFR BLD MANUAL: 24 % (ref 19.6–45.3)
MCH RBC QN AUTO: 29.2 PG (ref 26.6–33)
MCHC RBC AUTO-ENTMCNC: 33.4 G/DL (ref 31.5–35.7)
MCV RBC AUTO: 87.4 FL (ref 79–97)
MONOCYTES # BLD AUTO: 0.62 10*3/MM3 (ref 0.1–0.9)
NEUTROPHILS # BLD AUTO: 3.71 10*3/MM3 (ref 1.7–7)
NEUTROPHILS NFR BLD MANUAL: 60 % (ref 42.7–76)
NITRITE UR QL STRIP: NEGATIVE
OVALOCYTES BLD QL SMEAR: NORMAL
PH UR STRIP.AUTO: 7 [PH] (ref 5.5–8)
PLATELET # BLD AUTO: 252 10*3/MM3 (ref 140–450)
PMV BLD AUTO: 9.6 FL (ref 6–12)
POTASSIUM BLD-SCNC: 4.4 MMOL/L (ref 3.4–5)
PROT SERPL-MCNC: 8 G/DL (ref 6.3–8.6)
PROT UR QL STRIP: NEGATIVE
RBC # BLD AUTO: 5.55 10*6/MM3 (ref 4.14–5.8)
SMALL PLATELETS BLD QL SMEAR: ADEQUATE
SODIUM BLD-SCNC: 141 MMOL/L (ref 137–145)
SP GR UR STRIP: 1.02 (ref 1–1.03)
T4 FREE SERPL-MCNC: 1.1 NG/DL (ref 0.93–1.7)
TRIGL SERPL-MCNC: 95 MG/DL
TSH SERPL DL<=0.05 MIU/L-ACNC: 0.2 UIU/ML (ref 0.27–4.2)
UROBILINOGEN UR QL STRIP: NORMAL
VLDLC SERPL-MCNC: 19 MG/DL
WBC MORPH BLD: NORMAL
WBC NRBC COR # BLD: 6.18 10*3/MM3 (ref 3.4–10.8)

## 2020-01-31 PROCEDURE — 80053 COMPREHEN METABOLIC PANEL: CPT | Performed by: NURSE PRACTITIONER

## 2020-01-31 PROCEDURE — 80061 LIPID PANEL: CPT | Performed by: NURSE PRACTITIONER

## 2020-01-31 PROCEDURE — 36415 COLL VENOUS BLD VENIPUNCTURE: CPT | Performed by: NURSE PRACTITIONER

## 2020-01-31 PROCEDURE — 81003 URINALYSIS AUTO W/O SCOPE: CPT | Performed by: NURSE PRACTITIONER

## 2020-01-31 PROCEDURE — 84439 ASSAY OF FREE THYROXINE: CPT | Performed by: NURSE PRACTITIONER

## 2020-01-31 PROCEDURE — 83036 HEMOGLOBIN GLYCOSYLATED A1C: CPT | Performed by: NURSE PRACTITIONER

## 2020-01-31 PROCEDURE — 84443 ASSAY THYROID STIM HORMONE: CPT | Performed by: NURSE PRACTITIONER

## 2020-01-31 PROCEDURE — 96372 THER/PROPH/DIAG INJ SC/IM: CPT | Performed by: NURSE PRACTITIONER

## 2020-01-31 PROCEDURE — 85025 COMPLETE CBC W/AUTO DIFF WBC: CPT | Performed by: NURSE PRACTITIONER

## 2020-01-31 PROCEDURE — 99213 OFFICE O/P EST LOW 20 MIN: CPT | Performed by: NURSE PRACTITIONER

## 2020-01-31 RX ORDER — KETOROLAC TROMETHAMINE 30 MG/ML
60 INJECTION, SOLUTION INTRAMUSCULAR; INTRAVENOUS ONCE
Status: COMPLETED | OUTPATIENT
Start: 2020-01-31 | End: 2020-01-31

## 2020-01-31 RX ORDER — ATORVASTATIN CALCIUM 10 MG/1
10 TABLET, FILM COATED ORAL NIGHTLY
Qty: 30 TABLET | Refills: 2 | Status: SHIPPED | OUTPATIENT
Start: 2020-01-31 | End: 2020-04-08

## 2020-01-31 RX ADMIN — KETOROLAC TROMETHAMINE 60 MG: 30 INJECTION, SOLUTION INTRAMUSCULAR; INTRAVENOUS at 14:30

## 2020-02-01 PROBLEM — M54.2 NECK PAIN: Status: ACTIVE | Noted: 2020-02-01

## 2020-02-01 NOTE — PROGRESS NOTES
Subjective   Alpesh Small is a 39 y.o. male who presents to the office complaining of LEFT shoulder and neck pain that started a month ago.  Denies injury.  Has gone to the chiropractor which did not help.  Has also been seen at [facility] on 1/17 for atypical chest pain and thoracic strain and was given Lodine and Norflex which have not helped.    History of Present Illness     The following portions of the patient's history were reviewed and updated as appropriate: allergies, current medications, past family history, past medical history, past social history, past surgical history and problem list.    Review of Systems   Constitutional: Negative for chills, fatigue and fever.   HENT: Negative for congestion, sneezing, sore throat and trouble swallowing.    Eyes: Negative for visual disturbance.   Respiratory: Negative for cough, chest tightness, shortness of breath and wheezing.    Cardiovascular: Negative for chest pain, palpitations and leg swelling.   Gastrointestinal: Negative for abdominal pain, constipation, diarrhea, nausea and vomiting.   Genitourinary: Negative for dysuria, frequency and urgency.   Musculoskeletal: Positive for arthralgias and back pain. Negative for neck pain.   Skin: Negative for rash.   Neurological: Negative for dizziness, weakness and headaches.   Psychiatric/Behavioral:        In the past two weeks the pt has not felt down, depressed, hopeless or lost interest in doing things   All other systems reviewed and are negative.      Objective   Physical Exam   Constitutional: He is oriented to person, place, and time. He appears well-developed and well-nourished. He is cooperative.  Non-toxic appearance. No distress.   HENT:   Head: Normocephalic and atraumatic.   Right Ear: External ear normal.   Left Ear: External ear normal.   Nose: Nose normal.   Mouth/Throat: Oropharynx is clear and moist.   Eyes: Pupils are equal, round, and reactive to light. Conjunctivae and EOM are normal. Right  eye exhibits no discharge. Left eye exhibits no discharge. No scleral icterus.   Neck: Normal range of motion. Neck supple. No thyromegaly present.   Cardiovascular: Normal rate, regular rhythm, normal heart sounds and intact distal pulses. Exam reveals no gallop and no friction rub.   No murmur heard.  Pulmonary/Chest: Effort normal and breath sounds normal. No respiratory distress. He has no wheezes. He has no rales.   Abdominal: Soft. Bowel sounds are normal. He exhibits no distension. There is no tenderness. There is no rebound and no guarding.   Musculoskeletal: He exhibits no edema.        Cervical back: He exhibits decreased range of motion and tenderness.        Back:    No pain of the shoulder blade but he does have an area of localized swelling of soft tissue which could be a lipoma    Lymphadenopathy:     He has no cervical adenopathy.   Neurological: He is alert and oriented to person, place, and time. No cranial nerve deficit. GCS eye subscore is 4. GCS verbal subscore is 5. GCS motor subscore is 6.   Skin: Skin is warm and dry. No rash noted.   Psychiatric: He has a normal mood and affect. His behavior is normal. Judgment and thought content normal.   Nursing note and vitals reviewed.      Assessment/Plan   Alpesh was seen today for shoulder pain and back pain.    Diagnoses and all orders for this visit:    Neck pain  -     XR Spine Cervical 2 or 3 View; Future  -     ketorolac (TORADOL) injection 60 mg    Other orders  -     atorvastatin (LIPITOR) 10 MG tablet; Take 1 tablet by mouth Every Night.    Encouraged slow position changes.    Alternate ice and heat.

## 2020-02-03 DIAGNOSIS — M54.2 NECK PAIN: Primary | ICD-10-CM

## 2020-02-10 ENCOUNTER — HOSPITAL ENCOUNTER (OUTPATIENT)
Dept: MRI IMAGING | Facility: HOSPITAL | Age: 40
Discharge: HOME | End: 2020-02-10
Admitting: NURSE PRACTITIONER

## 2020-02-10 DIAGNOSIS — M54.2 NECK PAIN: ICD-10-CM

## 2020-02-10 PROCEDURE — 72141 MRI NECK SPINE W/O DYE: CPT

## 2020-03-25 DIAGNOSIS — H92.09 EAR ACHE: Primary | ICD-10-CM

## 2020-03-25 RX ORDER — CEFPROZIL 250 MG/5ML
250 POWDER, FOR SUSPENSION ORAL DAILY
Qty: 50 ML | Refills: 0 | Status: SHIPPED | OUTPATIENT
Start: 2020-03-25 | End: 2020-08-10

## 2020-04-08 RX ORDER — ATORVASTATIN CALCIUM 10 MG/1
10 TABLET, FILM COATED ORAL NIGHTLY
Qty: 30 TABLET | Refills: 2 | Status: SHIPPED | OUTPATIENT
Start: 2020-04-08 | End: 2020-08-04

## 2020-08-04 DIAGNOSIS — I10 ESSENTIAL HYPERTENSION: ICD-10-CM

## 2020-08-04 RX ORDER — ATORVASTATIN CALCIUM 10 MG/1
10 TABLET, FILM COATED ORAL NIGHTLY
Qty: 90 TABLET | Refills: 1 | Status: SHIPPED | OUTPATIENT
Start: 2020-08-04 | End: 2020-11-23 | Stop reason: DRUGHIGH

## 2020-08-10 ENCOUNTER — OFFICE VISIT (OUTPATIENT)
Dept: FAMILY MEDICINE CLINIC | Facility: CLINIC | Age: 40
End: 2020-08-10

## 2020-08-10 VITALS
HEIGHT: 68 IN | DIASTOLIC BLOOD PRESSURE: 68 MMHG | BODY MASS INDEX: 23.64 KG/M2 | WEIGHT: 156 LBS | TEMPERATURE: 96.6 F | HEART RATE: 62 BPM | OXYGEN SATURATION: 97 % | SYSTOLIC BLOOD PRESSURE: 100 MMHG

## 2020-08-10 DIAGNOSIS — M54.2 NECK PAIN: Primary | ICD-10-CM

## 2020-08-10 DIAGNOSIS — R51.9 ACUTE NONINTRACTABLE HEADACHE, UNSPECIFIED HEADACHE TYPE: ICD-10-CM

## 2020-08-10 PROCEDURE — 99213 OFFICE O/P EST LOW 20 MIN: CPT | Performed by: NURSE PRACTITIONER

## 2020-08-10 PROCEDURE — 96372 THER/PROPH/DIAG INJ SC/IM: CPT | Performed by: NURSE PRACTITIONER

## 2020-08-10 RX ORDER — METHYLPREDNISOLONE ACETATE 80 MG/ML
80 INJECTION, SUSPENSION INTRA-ARTICULAR; INTRALESIONAL; INTRAMUSCULAR; SOFT TISSUE ONCE
Status: COMPLETED | OUTPATIENT
Start: 2020-08-10 | End: 2020-08-10

## 2020-08-10 RX ORDER — CYCLOBENZAPRINE HCL 5 MG
5 TABLET ORAL NIGHTLY PRN
Qty: 7 TABLET | Refills: 0 | Status: SHIPPED | OUTPATIENT
Start: 2020-08-10 | End: 2020-11-11

## 2020-08-10 RX ORDER — KETOROLAC TROMETHAMINE 30 MG/ML
60 INJECTION, SOLUTION INTRAMUSCULAR; INTRAVENOUS ONCE
Status: COMPLETED | OUTPATIENT
Start: 2020-08-10 | End: 2020-08-10

## 2020-08-10 RX ADMIN — METHYLPREDNISOLONE ACETATE 80 MG: 80 INJECTION, SUSPENSION INTRA-ARTICULAR; INTRALESIONAL; INTRAMUSCULAR; SOFT TISSUE at 11:55

## 2020-08-10 RX ADMIN — KETOROLAC TROMETHAMINE 60 MG: 30 INJECTION, SOLUTION INTRAMUSCULAR; INTRAVENOUS at 11:54

## 2020-08-10 NOTE — PROGRESS NOTES
"Subjective   Alpesh Small is a 40 y.o. male who presents to the office complaining of neck pain the past two days and headaches over the past month.  Requesting to see \"orthopedic doctor.\"  Informed him that neck injuries require neurosurgery evaluation.  Initially he denied a neck injury but then he states that he moved a TV just slightly over a month ago.  Takes Tylenol which helps headache pain to resolve.  Denies vision changes.  Had cspine MRI in February of this year that showed minimal disc bulges.  Never did hear from him if he wanted to go to [employer] at [employer] and has recent shift change to 3-11 pm.    History of Present Illness     The following portions of the patient's history were reviewed and updated as appropriate: allergies, current medications, past family history, past medical history, past social history, past surgical history and problem list.    Review of Systems   Constitutional: Negative for chills, fatigue and fever.   HENT: Negative for congestion, sneezing, sore throat and trouble swallowing.    Eyes: Negative for visual disturbance.   Respiratory: Negative for cough, chest tightness, shortness of breath and wheezing.    Cardiovascular: Negative for chest pain, palpitations and leg swelling.   Gastrointestinal: Negative for abdominal pain, constipation, diarrhea, nausea and vomiting.   Genitourinary: Negative for dysuria, frequency and urgency.   Musculoskeletal: Positive for neck pain.   Skin: Negative for rash.   Neurological: Positive for headaches. Negative for dizziness and weakness.   Psychiatric/Behavioral:        In the past two weeks the pt has not felt down, depressed, hopeless or lost interest in doing things   All other systems reviewed and are negative.      Objective   Physical Exam   Constitutional: He is oriented to person, place, and time. He appears well-developed and well-nourished. He is cooperative.  Non-toxic appearance. No distress.   HENT:   Head: " "Normocephalic and atraumatic.   Right Ear: External ear normal.   Left Ear: External ear normal.   Nose: Nose normal.   Mouth/Throat: Oropharynx is clear and moist.   Eyes: Pupils are equal, round, and reactive to light. Conjunctivae and EOM are normal. Right eye exhibits no discharge. Left eye exhibits no discharge. No scleral icterus.   Neck: Normal range of motion. Neck supple. No thyromegaly present.   Cardiovascular: Normal rate, regular rhythm, normal heart sounds and intact distal pulses. Exam reveals no gallop and no friction rub.   No murmur heard.  Pulmonary/Chest: Effort normal and breath sounds normal. No respiratory distress. He has no wheezes. He has no rales.   Abdominal: Soft. Bowel sounds are normal. He exhibits no distension. There is no tenderness. There is no rebound and no guarding.   Musculoskeletal: Normal range of motion. He exhibits no edema.   Palpated his cervical spine but was never able to elicit pain, says sometimes he has to \"push real hard\"   Lymphadenopathy:     He has no cervical adenopathy.   Neurological: He is alert and oriented to person, place, and time. No cranial nerve deficit. GCS eye subscore is 4. GCS verbal subscore is 5. GCS motor subscore is 6.   Skin: Skin is warm and dry. No rash noted.   Psychiatric: He has a normal mood and affect. His behavior is normal. Judgment and thought content normal.   Nursing note and vitals reviewed.      Assessment/Plan   Alpesh was seen today for neck pain.    Diagnoses and all orders for this visit:    Neck pain  -     ketorolac (TORADOL) injection 60 mg  -     methylPREDNISolone acetate (DEPO-medrol) injection 80 mg    Acute nonintractable headache, unspecified headache type    Other orders  -     cyclobenzaprine (FLEXERIL) 5 MG tablet; Take 1 tablet by mouth At Night As Needed for Muscle Spasms.    Since Tylenol helps for headaches he may alternate between that and NSAID to help his neck.  Will notify my office should he want to go see " P.T.    Will take him off work today and back to regular schedule tomorrow.    Alternate ice/heat.

## 2020-09-16 DIAGNOSIS — R50.9 LOW GRADE FEVER: ICD-10-CM

## 2020-09-16 DIAGNOSIS — M79.10 MUSCLE ACHE: ICD-10-CM

## 2020-09-16 DIAGNOSIS — R51.9 NONINTRACTABLE HEADACHE, UNSPECIFIED CHRONICITY PATTERN, UNSPECIFIED HEADACHE TYPE: Primary | ICD-10-CM

## 2020-09-16 DIAGNOSIS — R52 BODY ACHES: ICD-10-CM

## 2020-11-11 ENCOUNTER — OFFICE VISIT (OUTPATIENT)
Dept: FAMILY MEDICINE CLINIC | Facility: CLINIC | Age: 40
End: 2020-11-11

## 2020-11-11 ENCOUNTER — LAB (OUTPATIENT)
Dept: LAB | Facility: OTHER | Age: 40
End: 2020-11-11

## 2020-11-11 VITALS
SYSTOLIC BLOOD PRESSURE: 108 MMHG | HEIGHT: 68 IN | OXYGEN SATURATION: 97 % | DIASTOLIC BLOOD PRESSURE: 84 MMHG | BODY MASS INDEX: 23.64 KG/M2 | HEART RATE: 64 BPM | WEIGHT: 156 LBS

## 2020-11-11 DIAGNOSIS — H92.02 ACUTE OTALGIA, LEFT: ICD-10-CM

## 2020-11-11 DIAGNOSIS — I10 ESSENTIAL HYPERTENSION: Primary | ICD-10-CM

## 2020-11-11 DIAGNOSIS — I10 ESSENTIAL HYPERTENSION: ICD-10-CM

## 2020-11-11 LAB
ALBUMIN SERPL-MCNC: 4.4 G/DL (ref 3.5–5)
ALBUMIN/GLOB SERPL: 1.3 G/DL (ref 1.1–1.8)
ALP SERPL-CCNC: 124 U/L (ref 38–126)
ALT SERPL W P-5'-P-CCNC: 21 U/L
ANION GAP SERPL CALCULATED.3IONS-SCNC: 6 MMOL/L (ref 5–15)
AST SERPL-CCNC: 25 U/L (ref 17–59)
BASOPHILS # BLD AUTO: 0.05 10*3/MM3 (ref 0–0.2)
BASOPHILS NFR BLD AUTO: 0.7 % (ref 0–1.5)
BILIRUB SERPL-MCNC: 1.1 MG/DL (ref 0.2–1.3)
BILIRUB UR QL STRIP: NEGATIVE
BUN SERPL-MCNC: 15 MG/DL (ref 7–23)
BUN/CREAT SERPL: 14.6 (ref 7–25)
CALCIUM SPEC-SCNC: 9.6 MG/DL (ref 8.4–10.2)
CHLORIDE SERPL-SCNC: 101 MMOL/L (ref 101–112)
CHOLEST SERPL-MCNC: 233 MG/DL (ref 150–200)
CLARITY UR: CLEAR
CO2 SERPL-SCNC: 31 MMOL/L (ref 22–30)
COLOR UR: YELLOW
CREAT SERPL-MCNC: 1.03 MG/DL (ref 0.7–1.3)
DEPRECATED RDW RBC AUTO: 45.1 FL (ref 37–54)
EOSINOPHIL # BLD AUTO: 0.43 10*3/MM3 (ref 0–0.4)
EOSINOPHIL NFR BLD AUTO: 5.8 % (ref 0.3–6.2)
ERYTHROCYTE [DISTWIDTH] IN BLOOD BY AUTOMATED COUNT: 13.6 % (ref 12.3–15.4)
GFR SERPL CREATININE-BSD FRML MDRD: 80 ML/MIN/1.73 (ref 63–147)
GLOBULIN UR ELPH-MCNC: 3.4 GM/DL (ref 2.3–3.5)
GLUCOSE SERPL-MCNC: 99 MG/DL (ref 70–99)
GLUCOSE UR STRIP-MCNC: NEGATIVE MG/DL
HCT VFR BLD AUTO: 48 % (ref 37.5–51)
HDLC SERPL-MCNC: 37 MG/DL (ref 40–59)
HGB BLD-MCNC: 15.9 G/DL (ref 13–17.7)
HGB UR QL STRIP.AUTO: NEGATIVE
KETONES UR QL STRIP: NEGATIVE
LDLC SERPL CALC-MCNC: 153 MG/DL
LDLC/HDLC SERPL: 4.02 {RATIO} (ref 0–3.55)
LEUKOCYTE ESTERASE UR QL STRIP.AUTO: NEGATIVE
LYMPHOCYTES # BLD AUTO: 1.63 10*3/MM3 (ref 0.7–3.1)
LYMPHOCYTES NFR BLD AUTO: 22 % (ref 19.6–45.3)
MCH RBC QN AUTO: 30.2 PG (ref 26.6–33)
MCHC RBC AUTO-ENTMCNC: 33.1 G/DL (ref 31.5–35.7)
MCV RBC AUTO: 91.1 FL (ref 79–97)
MONOCYTES # BLD AUTO: 0.71 10*3/MM3 (ref 0.1–0.9)
MONOCYTES NFR BLD AUTO: 9.6 % (ref 5–12)
NEUTROPHILS NFR BLD AUTO: 4.58 10*3/MM3 (ref 1.7–7)
NEUTROPHILS NFR BLD AUTO: 61.9 % (ref 42.7–76)
NITRITE UR QL STRIP: NEGATIVE
PH UR STRIP.AUTO: 8.5 [PH] (ref 5.5–8)
PLATELET # BLD AUTO: 227 10*3/MM3 (ref 140–450)
PMV BLD AUTO: 9.6 FL (ref 6–12)
POTASSIUM SERPL-SCNC: 4.7 MMOL/L (ref 3.4–5)
PROT SERPL-MCNC: 7.8 G/DL (ref 6.3–8.6)
PROT UR QL STRIP: NEGATIVE
RBC # BLD AUTO: 5.27 10*6/MM3 (ref 4.14–5.8)
SODIUM SERPL-SCNC: 138 MMOL/L (ref 137–145)
SP GR UR STRIP: 1.02 (ref 1–1.03)
TRIGL SERPL-MCNC: 236 MG/DL
UROBILINOGEN UR QL STRIP: ABNORMAL
VLDLC SERPL-MCNC: 43 MG/DL (ref 5–40)
WBC # BLD AUTO: 7.4 10*3/MM3 (ref 3.4–10.8)

## 2020-11-11 PROCEDURE — 85025 COMPLETE CBC W/AUTO DIFF WBC: CPT | Performed by: NURSE PRACTITIONER

## 2020-11-11 PROCEDURE — 80061 LIPID PANEL: CPT | Performed by: NURSE PRACTITIONER

## 2020-11-11 PROCEDURE — 99213 OFFICE O/P EST LOW 20 MIN: CPT | Performed by: NURSE PRACTITIONER

## 2020-11-11 PROCEDURE — 81003 URINALYSIS AUTO W/O SCOPE: CPT | Performed by: NURSE PRACTITIONER

## 2020-11-11 PROCEDURE — 84439 ASSAY OF FREE THYROXINE: CPT | Performed by: NURSE PRACTITIONER

## 2020-11-11 PROCEDURE — 84443 ASSAY THYROID STIM HORMONE: CPT | Performed by: NURSE PRACTITIONER

## 2020-11-11 PROCEDURE — 80053 COMPREHEN METABOLIC PANEL: CPT | Performed by: NURSE PRACTITIONER

## 2020-11-11 PROCEDURE — 36415 COLL VENOUS BLD VENIPUNCTURE: CPT | Performed by: NURSE PRACTITIONER

## 2020-11-11 RX ORDER — FLUTICASONE PROPIONATE 50 MCG
2 SPRAY, SUSPENSION (ML) NASAL DAILY
Qty: 1 BOTTLE | Refills: 0 | Status: SHIPPED | OUTPATIENT
Start: 2020-11-11 | End: 2021-07-26

## 2020-11-11 RX ORDER — PREDNISONE 10 MG/1
10 TABLET ORAL DAILY
Qty: 7 TABLET | Refills: 0 | Status: SHIPPED | OUTPATIENT
Start: 2020-11-11 | End: 2021-07-26

## 2020-11-12 LAB
T4 FREE SERPL-MCNC: 1.08 NG/DL (ref 0.93–1.7)
TSH SERPL DL<=0.05 MIU/L-ACNC: 1.24 UIU/ML (ref 0.27–4.2)

## 2020-11-23 RX ORDER — ATORVASTATIN CALCIUM 20 MG/1
20 TABLET, FILM COATED ORAL NIGHTLY
Qty: 90 TABLET | Refills: 1 | Status: SHIPPED | OUTPATIENT
Start: 2020-11-23 | End: 2021-11-15

## 2020-11-23 RX ORDER — OMEGA-3 FATTY ACIDS CAP DELAYED RELEASE 1000 MG 1000 MG
CAPSULE DELAYED RELEASE ORAL
Qty: 60 CAPSULE | Refills: 5 | Status: SHIPPED | OUTPATIENT
Start: 2020-11-23 | End: 2021-07-26

## 2020-11-29 NOTE — PROGRESS NOTES
Subjective   Alpesh Small is a 40 y.o. male who presents to the office complaining of LEFT ear pain the past week.  Denies sneezing.  Denies nasal drainage.  Denies ear drainage.  Has been using a cotton ball in his ear to try to help with the pain.  Has not been taking his cholesterol medication due to headaches.   History of Present Illness     The following portions of the patient's history were reviewed and updated as appropriate: allergies, current medications, past family history, past medical history, past social history, past surgical history and problem list.    Review of Systems   Constitutional: Negative for chills, fatigue and fever.   HENT: Positive for ear pain. Negative for congestion, sneezing, sore throat and trouble swallowing.    Eyes: Negative for visual disturbance.   Respiratory: Negative for cough, chest tightness, shortness of breath and wheezing.    Cardiovascular: Negative for chest pain, palpitations and leg swelling.   Gastrointestinal: Negative for abdominal pain, constipation, diarrhea, nausea and vomiting.   Genitourinary: Negative for dysuria, frequency and urgency.   Musculoskeletal: Negative for neck pain.   Skin: Negative for rash.   Neurological: Negative for dizziness, weakness and headaches.   Psychiatric/Behavioral:        In the past two weeks the pt has not felt down, depressed, hopeless or lost interest in doing things   All other systems reviewed and are negative.      Objective   Physical Exam  Vitals signs and nursing note reviewed.   Constitutional:       Appearance: He is well-developed.   HENT:      Head: Normocephalic and atraumatic.      Right Ear: Tympanic membrane and external ear normal.      Left Ear: External ear normal. A middle ear effusion is present.      Nose: Nose normal.   Eyes:      General: No scleral icterus.        Right eye: No discharge.         Left eye: No discharge.      Conjunctiva/sclera: Conjunctivae normal.      Pupils: Pupils are equal,  round, and reactive to light.   Neck:      Musculoskeletal: Normal range of motion and neck supple.      Thyroid: No thyromegaly.   Cardiovascular:      Rate and Rhythm: Normal rate and regular rhythm.      Heart sounds: Normal heart sounds. No murmur. No friction rub. No gallop.    Pulmonary:      Effort: Pulmonary effort is normal. No respiratory distress.      Breath sounds: Normal breath sounds. No wheezing or rales.   Abdominal:      General: Bowel sounds are normal. There is no distension.      Palpations: Abdomen is soft.      Tenderness: There is no abdominal tenderness. There is no guarding or rebound.   Musculoskeletal: Normal range of motion.   Lymphadenopathy:      Cervical: No cervical adenopathy.   Skin:     General: Skin is warm and dry.      Findings: No rash.   Neurological:      Mental Status: He is alert and oriented to person, place, and time.      GCS: GCS eye subscore is 4. GCS verbal subscore is 5. GCS motor subscore is 6.      Cranial Nerves: No cranial nerve deficit.   Psychiatric:         Behavior: Behavior normal.         Thought Content: Thought content normal.         Judgment: Judgment normal.         Assessment/Plan   Diagnoses and all orders for this visit:    1. Essential hypertension (Primary)  -     CBC & Differential; Future  -     Comprehensive Metabolic Panel; Future  -     Lipid Panel; Future  -     T4, Free; Future  -     TSH; Future  -     Urinalysis With Culture If Indicated -; Future    2. Acute otalgia, left    Other orders  -     fluticasone (FLONASE) 50 MCG/ACT nasal spray; 2 sprays into the nostril(s) as directed by provider Daily. Administer 2 sprays in each nostril for each dose.  Dispense: 1 bottle; Refill: 0  -     predniSONE (DELTASONE) 10 MG tablet; Take 1 tablet by mouth Daily.  Dispense: 7 tablet; Refill: 0    Will start on Flonase and Prednisone.  No smoke exposure.  Update fasting labs.               This document has been electronically signed by Vanita Car  VICTORIA Espino on November 28, 2020 22:10 CST

## 2021-02-03 DIAGNOSIS — I10 ESSENTIAL HYPERTENSION: ICD-10-CM

## 2021-07-26 ENCOUNTER — OFFICE VISIT (OUTPATIENT)
Dept: FAMILY MEDICINE CLINIC | Facility: CLINIC | Age: 41
End: 2021-07-26

## 2021-07-26 VITALS
TEMPERATURE: 96.5 F | WEIGHT: 148 LBS | BODY MASS INDEX: 22.43 KG/M2 | SYSTOLIC BLOOD PRESSURE: 114 MMHG | OXYGEN SATURATION: 98 % | HEIGHT: 68 IN | HEART RATE: 62 BPM | DIASTOLIC BLOOD PRESSURE: 84 MMHG

## 2021-07-26 DIAGNOSIS — M54.50 CHRONIC MIDLINE LOW BACK PAIN, UNSPECIFIED WHETHER SCIATICA PRESENT: Primary | ICD-10-CM

## 2021-07-26 DIAGNOSIS — I10 ESSENTIAL HYPERTENSION: ICD-10-CM

## 2021-07-26 DIAGNOSIS — G89.29 CHRONIC MIDLINE LOW BACK PAIN, UNSPECIFIED WHETHER SCIATICA PRESENT: Primary | ICD-10-CM

## 2021-07-26 PROCEDURE — 96372 THER/PROPH/DIAG INJ SC/IM: CPT | Performed by: NURSE PRACTITIONER

## 2021-07-26 PROCEDURE — 99213 OFFICE O/P EST LOW 20 MIN: CPT | Performed by: NURSE PRACTITIONER

## 2021-07-26 RX ORDER — METHYLPREDNISOLONE ACETATE 80 MG/ML
80 INJECTION, SUSPENSION INTRA-ARTICULAR; INTRALESIONAL; INTRAMUSCULAR; SOFT TISSUE ONCE
Status: COMPLETED | OUTPATIENT
Start: 2021-07-26 | End: 2021-07-26

## 2021-07-26 RX ORDER — NAPROXEN 375 MG/1
375 TABLET, DELAYED RELEASE ORAL
Qty: 30 TABLET | Refills: 1 | Status: SHIPPED | OUTPATIENT
Start: 2021-07-26 | End: 2021-10-19

## 2021-07-26 RX ORDER — TRIAMCINOLONE ACETONIDE 40 MG/ML
40 INJECTION, SUSPENSION INTRA-ARTICULAR; INTRAMUSCULAR ONCE
Status: COMPLETED | OUTPATIENT
Start: 2021-07-26 | End: 2021-07-26

## 2021-07-26 RX ORDER — KETOROLAC TROMETHAMINE 30 MG/ML
30 INJECTION, SOLUTION INTRAMUSCULAR; INTRAVENOUS ONCE
Status: COMPLETED | OUTPATIENT
Start: 2021-07-26 | End: 2021-07-26

## 2021-07-26 RX ADMIN — METHYLPREDNISOLONE ACETATE 80 MG: 80 INJECTION, SUSPENSION INTRA-ARTICULAR; INTRALESIONAL; INTRAMUSCULAR; SOFT TISSUE at 10:45

## 2021-07-26 RX ADMIN — KETOROLAC TROMETHAMINE 30 MG: 30 INJECTION, SOLUTION INTRAMUSCULAR; INTRAVENOUS at 10:45

## 2021-07-26 RX ADMIN — TRIAMCINOLONE ACETONIDE 40 MG: 40 INJECTION, SUSPENSION INTRA-ARTICULAR; INTRAMUSCULAR at 10:46

## 2021-07-26 NOTE — PROGRESS NOTES
"Subjective   Alpesh Small is a 41 y.o. male who presents to the office complaining of chronic midline low back pain that has \"been there awhile\" which he says is more than six months.  Describes as a \"constant bruise.\"   Unable to bend over and tie his shoes.  Has never had lumbar films here but have evaluated his cervical and thoracic spine.  Has taken Ibuprofen which only helps for a few hours, muscle relaxers did not help at all.    Tells me that he did see adolph Tse last year for an adjustment but later in the day went to the ED because was unrelenting, was told he had slipped discs.  Carmencita did xrays that day and told patient that his \"hip joint was not sitting in the right place.\"    History of Present Illness     The following portions of the patient's history were reviewed and updated as appropriate: allergies, current medications, past family history, past medical history, past social history, past surgical history and problem list.    Review of Systems   Constitutional: Negative for chills, fatigue and fever.   HENT: Negative for congestion, sneezing, sore throat and trouble swallowing.    Eyes: Negative for visual disturbance.   Respiratory: Negative for cough, chest tightness, shortness of breath and wheezing.    Cardiovascular: Negative for chest pain, palpitations and leg swelling.   Gastrointestinal: Negative for abdominal pain, constipation, diarrhea, nausea and vomiting.   Genitourinary: Negative for dysuria, frequency and urgency.   Musculoskeletal: Positive for back pain. Negative for neck pain.   Skin: Negative for rash.   Neurological: Negative for dizziness, weakness and headaches.   Psychiatric/Behavioral:        In the past two weeks the pt has not felt down, depressed, hopeless or lost interest in doing things   All other systems reviewed and are negative.      Objective   Physical Exam  Vitals and nursing note reviewed.   Constitutional:       General: He is not in acute distress.   "   Appearance: Normal appearance. He is well-developed. He is not ill-appearing, toxic-appearing or diaphoretic.      Comments: Sitting more on his LEFT hip in chair   HENT:      Head: Normocephalic and atraumatic.      Right Ear: External ear normal.      Left Ear: External ear normal.      Nose: Nose normal.   Eyes:      General: No scleral icterus.        Right eye: No discharge.         Left eye: No discharge.      Conjunctiva/sclera: Conjunctivae normal.      Pupils: Pupils are equal, round, and reactive to light.   Neck:      Thyroid: No thyromegaly.   Cardiovascular:      Rate and Rhythm: Normal rate and regular rhythm.      Heart sounds: Normal heart sounds. No murmur heard.   No friction rub. No gallop.    Pulmonary:      Effort: Pulmonary effort is normal. No respiratory distress.      Breath sounds: Normal breath sounds. No wheezing or rales.   Abdominal:      General: Bowel sounds are normal. There is no distension.      Palpations: Abdomen is soft.      Tenderness: There is no abdominal tenderness. There is no guarding or rebound.   Musculoskeletal:         General: Normal range of motion.      Cervical back: Normal range of motion and neck supple.      Lumbar back: No edema, tenderness or bony tenderness. Normal range of motion. Negative right straight leg raise test and negative left straight leg raise test.   Lymphadenopathy:      Cervical: No cervical adenopathy.   Skin:     General: Skin is warm and dry.      Findings: No rash.   Neurological:      Mental Status: He is alert and oriented to person, place, and time.      Cranial Nerves: No cranial nerve deficit.   Psychiatric:         Behavior: Behavior normal.         Thought Content: Thought content normal.         Judgment: Judgment normal.         Assessment/Plan   Diagnoses and all orders for this visit:    1. Chronic midline low back pain, unspecified whether sciatica present (Primary)  -     XR Spine Lumbar 2 or 3 View  -      methylPREDNISolone acetate (DEPO-medrol) injection 80 mg  -     triamcinolone acetonide (KENALOG-40) injection 40 mg  -     ketorolac (TORADOL) injection 30 mg    Other orders  -     naproxen (EC NAPROSYN) 375 MG tablet delayed-release EC tablet; Take 1 tablet by mouth Daily With Breakfast.  Dispense: 30 tablet; Refill: 1    Agreeable for injections today, will also change his NSAID.  Instructed to take with food.  Continue using heating pad, TENS unit as needed.               This document has been electronically signed by VICTORIA Lund on July 26, 2021 10:46 CDT

## 2021-09-01 ENCOUNTER — APPOINTMENT (OUTPATIENT)
Dept: GENERAL RADIOLOGY | Facility: HOSPITAL | Age: 41
End: 2021-09-01

## 2021-09-01 ENCOUNTER — HOSPITAL ENCOUNTER (EMERGENCY)
Facility: HOSPITAL | Age: 41
Discharge: HOME | End: 2021-09-01
Attending: EMERGENCY MEDICINE | Admitting: EMERGENCY MEDICINE

## 2021-09-01 VITALS
HEART RATE: 68 BPM | DIASTOLIC BLOOD PRESSURE: 84 MMHG | OXYGEN SATURATION: 97 % | BODY MASS INDEX: 21.98 KG/M2 | SYSTOLIC BLOOD PRESSURE: 122 MMHG | WEIGHT: 145 LBS | TEMPERATURE: 97.5 F | RESPIRATION RATE: 18 BRPM | HEIGHT: 68 IN

## 2021-09-01 DIAGNOSIS — H69.82 DYSFUNCTION OF LEFT EUSTACHIAN TUBE: Primary | ICD-10-CM

## 2021-09-01 DIAGNOSIS — R51.9 ACUTE NONINTRACTABLE HEADACHE, UNSPECIFIED HEADACHE TYPE: ICD-10-CM

## 2021-09-01 DIAGNOSIS — U07.1 COVID-19: ICD-10-CM

## 2021-09-01 PROCEDURE — 99283 EMERGENCY DEPT VISIT LOW MDM: CPT

## 2021-09-01 PROCEDURE — 71045 X-RAY EXAM CHEST 1 VIEW: CPT

## 2021-09-01 RX ORDER — FLUTICASONE PROPIONATE 50 MCG
2 SPRAY, SUSPENSION (ML) NASAL DAILY
Status: DISCONTINUED | OUTPATIENT
Start: 2021-09-01 | End: 2021-09-01

## 2021-09-01 RX ORDER — IBUPROFEN 600 MG/1
600 TABLET ORAL ONCE
Status: COMPLETED | OUTPATIENT
Start: 2021-09-01 | End: 2021-09-01

## 2021-09-01 RX ORDER — CYCLOBENZAPRINE HCL 10 MG
10 TABLET ORAL ONCE
Status: COMPLETED | OUTPATIENT
Start: 2021-09-01 | End: 2021-09-01

## 2021-09-01 RX ORDER — CETIRIZINE HYDROCHLORIDE 10 MG/1
10 TABLET ORAL ONCE
Status: COMPLETED | OUTPATIENT
Start: 2021-09-01 | End: 2021-09-01

## 2021-09-01 RX ORDER — LIDOCAINE 50 MG/G
1 PATCH TOPICAL
Status: DISCONTINUED | OUTPATIENT
Start: 2021-09-01 | End: 2021-09-01

## 2021-09-01 RX ORDER — FLUTICASONE PROPIONATE 50 MCG
2 SPRAY, SUSPENSION (ML) NASAL DAILY
Status: DISCONTINUED | OUTPATIENT
Start: 2021-09-01 | End: 2021-09-01 | Stop reason: HOSPADM

## 2021-09-01 RX ORDER — LORATADINE 10 MG/1
10 TABLET ORAL DAILY
Qty: 14 TABLET | Refills: 0 | Status: SHIPPED | OUTPATIENT
Start: 2021-09-01 | End: 2021-10-19

## 2021-09-01 RX ORDER — LIDOCAINE 50 MG/G
1 PATCH TOPICAL
Status: DISCONTINUED | OUTPATIENT
Start: 2021-09-01 | End: 2021-09-01 | Stop reason: HOSPADM

## 2021-09-01 RX ORDER — FLUTICASONE PROPIONATE 50 MCG
2 SPRAY, SUSPENSION (ML) NASAL DAILY
Qty: 16 G | Refills: 3 | Status: SHIPPED | OUTPATIENT
Start: 2021-09-01 | End: 2021-10-19

## 2021-09-01 RX ADMIN — CETIRIZINE HYDROCHLORIDE 10 MG: 10 TABLET, FILM COATED ORAL at 04:48

## 2021-09-01 RX ADMIN — CYCLOBENZAPRINE 10 MG: 10 TABLET, FILM COATED ORAL at 04:48

## 2021-09-01 RX ADMIN — LIDOCAINE 1 PATCH: 50 PATCH CUTANEOUS at 04:48

## 2021-09-01 RX ADMIN — IBUPROFEN 600 MG: 600 TABLET ORAL at 06:37

## 2021-09-01 RX ADMIN — FLUTICASONE PROPIONATE 2 SPRAY: 50 SPRAY, METERED NASAL at 05:51

## 2021-09-01 NOTE — DISCHARGE INSTRUCTIONS
Please return new or worsening symptoms.  Use medications sent to the pharmacy to help with fluid buildup in the ear.  Tylenol Motrin for discomfort.  Follow-up with primary care.

## 2021-09-01 NOTE — ED PROVIDER NOTES
Subjective   41-year-old male presents the emergency department with complaint of left-sided ear pain, upper back pain and headache.  He reports that he was tested and confirmed positive for Covid approximately 9 days ago.  Feels like the symptoms have run their course but over the last 2 days has developed left ear pain and decreased hearing.  Pain radiating down into his left upper back and up into his left head.  Reports he is to have history of recurrent ear infections.  He also reports feeling like his chest is full of fluid.    Family history, surgical history, social history, current medications and allergies are reviewed with the patient and triage documentation and vitals are reviewed.      History provided by:  Patient   used: No        Review of Systems   Constitutional: Negative for chills and fever.   HENT: Positive for ear pain and hearing loss. Negative for congestion, ear discharge, facial swelling and sore throat.    Eyes: Negative for photophobia and visual disturbance.   Respiratory: Negative for cough, shortness of breath and wheezing.    Cardiovascular: Negative for chest pain, palpitations and leg swelling.   Gastrointestinal: Negative for abdominal pain, diarrhea, nausea and vomiting.   Endocrine: Negative for polydipsia, polyphagia and polyuria.   Genitourinary: Negative for dysuria, frequency and urgency.   Musculoskeletal: Negative for arthralgias, back pain, myalgias and neck pain.   Skin: Negative.    Allergic/Immunologic: Negative.    Neurological: Negative.    Hematological: Negative.    Psychiatric/Behavioral: Negative.        Past Medical History:   Diagnosis Date   • Acute nonintractable headache 8/10/2020   • Congenital absence of one kidney    • Gastroesophageal reflux disease 9/19/2019   • Hypertension 10/14/2019   • SVT (supraventricular tachycardia) (CMS/[facility]) 12/2016       Allergies   Allergen Reactions   • Codeine Hives   • Penicillins Hives       Past  "Surgical History:   Procedure Laterality Date   • APPENDECTOMY     • COLONOSCOPY N/A 9/4/2019    Procedure: COLONOSCOPY;  Surgeon: Alpesh Jackson MD;  Location: [facility] ENDOSCOPY;  Service: Gastroenterology   • ENDOSCOPY N/A 9/4/2019    Procedure: ESOPHAGOGASTRODUODENOSCOPY;  Surgeon: Alpesh Jackson MD;  Location: [facility] ENDOSCOPY;  Service: Gastroenterology   • UPPER GASTROINTESTINAL ENDOSCOPY  09/04/2019       Family History   Problem Relation Age of Onset   • Lung cancer Father    • Heart attack Father    • Stroke Father    • Diabetes Father    • Hypertension Father    • Hypertension Mother        Social History     Socioeconomic History   • Marital status:      Spouse name: Beulah   • Number of children: 1   • Years of education: Not on file   • Highest education level: Not on file   Tobacco Use   • Smoking status: Current Some Day Smoker     Years: 10.00     Types: Electronic Cigarette, Cigarettes   • Smokeless tobacco: Never Used   Substance and Sexual Activity   • Alcohol use: Yes     Comment: 08/29/2019 - Patinet states he consumes an alcoholic beverage \"every now and then\".   • Drug use: No     Types: Marijuana, Methamphetamines     Comment: 08/29/2019 - Patient states he has a past history of Marijuana and Methamphetamines.   • Sexual activity: Defer           Objective   Physical Exam  Vitals and nursing note reviewed.   Constitutional:       General: He is not in acute distress.     Appearance: Normal appearance. He is normal weight. He is not ill-appearing, toxic-appearing or diaphoretic.   HENT:      Head: Normocephalic.      Right Ear: Ear canal and external ear normal. A middle ear effusion is present.   Eyes:      Conjunctiva/sclera: Conjunctivae normal.      Pupils: Pupils are equal, round, and reactive to light.   Cardiovascular:      Rate and Rhythm: Normal rate and regular rhythm.      Pulses: Normal pulses.   Pulmonary:      Effort: Pulmonary effort is normal. No respiratory distress. " "     Breath sounds: Normal breath sounds. No wheezing.   Abdominal:      General: Bowel sounds are normal.      Palpations: Abdomen is soft.   Musculoskeletal:         General: Normal range of motion.      Cervical back: Normal range of motion and neck supple. No rigidity or tenderness.   Lymphadenopathy:      Cervical: Cervical adenopathy present.   Skin:     General: Skin is warm and dry.      Capillary Refill: Capillary refill takes less than 2 seconds.   Neurological:      Mental Status: He is alert.   Psychiatric:         Mood and Affect: Mood normal.         Behavior: Behavior normal.         Procedures  none         ED Course    Labs Reviewed - No data to display  XR Chest 1 View    Result Date: 9/1/2021  Narrative: PORTABLE CHEST CLINICAL HISTORY:  post covid, \"fluid\" COMPARISON:  None. FINDINGS:  Single portable view of the chest obtained.  The lungs are well expanded and clear.  Cardiac size is within normal limits.  Vascularity is normal considering technique.  No pleural fluid is demonstrated by portable imaging.     Impression: No active disease by portable imaging. Electronically signed by:  Maynor Emery MD  9/1/2021 5:10 AM CDT Workstation: [id number]          [facility]  Number of Diagnoses or Management Options     Amount and/or Complexity of Data Reviewed  Tests in the radiology section of CPT®: reviewed    Patient Progress  Patient progress: stable    Work-up unremarkable.  Chest x-ray clear.  Fluid level behind Left TM. Patient is treated symptomatically and agreeable to discharge.    Final diagnoses:   COVID-19   Acute nonintractable headache, unspecified headache type   Dysfunction of left eustachian tube       ED Disposition  ED Disposition     ED Disposition Condition Comment    Discharge Stable           Vanita Espino, APRN  [facility] [facility] [address] [place] [address] [phone]               Medication List      New Prescriptions    fluticasone 50 MCG/ACT nasal spray  Commonly known as: " FLONASE  2 sprays into the nostril(s) as directed by provider Daily for 14 days.     loratadine 10 MG tablet  Commonly known as: CLARITIN  Take 1 tablet by mouth Daily for 14 days.           Where to Get Your Medications      These medications were sent to [facility] - [address] - [phone]  - [phone] [address] [address]    Phone: [phone]   · fluticasone 50 MCG/ACT nasal spray  · loratadine 10 MG tablet          Benedict Oconnor,   09/02/21 0054

## 2021-10-19 ENCOUNTER — OFFICE VISIT (OUTPATIENT)
Dept: FAMILY MEDICINE CLINIC | Facility: CLINIC | Age: 41
End: 2021-10-19

## 2021-10-19 VITALS
HEIGHT: 68 IN | HEART RATE: 68 BPM | SYSTOLIC BLOOD PRESSURE: 104 MMHG | OXYGEN SATURATION: 99 % | BODY MASS INDEX: 22.88 KG/M2 | TEMPERATURE: 97 F | WEIGHT: 151 LBS | DIASTOLIC BLOOD PRESSURE: 76 MMHG

## 2021-10-19 DIAGNOSIS — R07.81 RIB PAIN ON LEFT SIDE: Primary | ICD-10-CM

## 2021-10-19 PROCEDURE — 96372 THER/PROPH/DIAG INJ SC/IM: CPT | Performed by: NURSE PRACTITIONER

## 2021-10-19 PROCEDURE — 99213 OFFICE O/P EST LOW 20 MIN: CPT | Performed by: NURSE PRACTITIONER

## 2021-10-19 RX ORDER — KETOROLAC TROMETHAMINE 30 MG/ML
30 INJECTION, SOLUTION INTRAMUSCULAR; INTRAVENOUS ONCE
Status: COMPLETED | OUTPATIENT
Start: 2021-10-19 | End: 2021-10-19

## 2021-10-19 RX ADMIN — KETOROLAC TROMETHAMINE 30 MG: 30 INJECTION, SOLUTION INTRAMUSCULAR; INTRAVENOUS at 15:07

## 2021-10-19 NOTE — PROGRESS NOTES
Subjective   Alpesh Small is a 41 y.o. 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  He heard a pop.  The pain is worsening, says he's unable to bend, twist or lay on it.  Says it's not as bad with deep breathing.  Has taken Aleve and Ibuprofen without relief.  Had a CXR in September that was no acute abnormality.  Has missed work.  Smokes up to three cigarettes a day.  Has had previous rib fractures.  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    History of Present Illness     The following portions of the patient's history were reviewed and updated as appropriate: allergies, current medications, past family history, past medical history, past social history, past surgical history and problem list.    Review of Systems   Constitutional: Negative for chills, fatigue and fever.   HENT: Negative for congestion, sneezing, sore throat and trouble swallowing.    Eyes: Negative for visual disturbance.   Respiratory: Positive for shortness of breath. Negative for cough, chest tightness and wheezing.    Cardiovascular: Negative for chest pain, palpitations and leg swelling.   Gastrointestinal: Negative for abdominal pain, constipation, diarrhea, nausea and vomiting.   Genitourinary: Negative for dysuria, frequency and urgency.   Musculoskeletal: Negative for neck pain.   Skin: Negative for rash.   Neurological: Negative for dizziness, weakness and headaches.   Psychiatric/Behavioral:        In the past two weeks the pt has not felt down, depressed, hopeless or lost interest in doing things   All other systems reviewed and are negative.      Objective   Physical Exam  Vitals and nursing note reviewed.   Constitutional:       Appearance: He is well-developed.   HENT:      Head: Normocephalic and atraumatic.      Right Ear: External ear normal.      Left Ear: External ear normal.      Nose: Nose normal.   Eyes:      General: No scleral icterus.         Right eye: No discharge.         Left eye: No discharge.      Conjunctiva/sclera: Conjunctivae normal.      Pupils: Pupils are equal, round, and reactive to light.   Neck:      Thyroid: No thyromegaly.   Cardiovascular:      Rate and Rhythm: Normal rate and regular rhythm.      Heart sounds: Normal heart sounds. No murmur heard.  No friction rub. No gallop.    Pulmonary:      Effort: Pulmonary effort is normal. No respiratory distress.      Breath sounds: Normal breath sounds. No wheezing or rales.   Chest:      Chest wall: Tenderness present.       Abdominal:      General: Bowel sounds are normal. There is no distension.      Palpations: Abdomen is soft.      Tenderness: There is no abdominal tenderness. There is no guarding or rebound.   Musculoskeletal:         General: Normal range of motion.      Cervical back: Normal range of motion and neck supple.   Lymphadenopathy:      Cervical: No cervical adenopathy.   Skin:     General: Skin is warm and dry.      Findings: No rash.   Neurological:      Mental Status: He is alert and oriented to person, place, and time.      Cranial Nerves: No cranial nerve deficit.   Psychiatric:         Behavior: Behavior normal.         Thought Content: Thought content normal.         Judgment: Judgment normal.         Assessment/Plan   Diagnoses and all orders for this visit:    1. Rib pain on left side (Primary)  -     XR Ribs 2 View Left  -     ketorolac (TORADOL) injection 30 mg    Is agreeable to receiving Toradol injection.    Continue with deep breathing, may use a pillow to splint.  May also use OTC topical pain relievers prn.                 This document has been electronically signed by VICTORIA Lund on October 20, 2021 12:52 CDT

## 2021-10-22 DIAGNOSIS — I10 ESSENTIAL HYPERTENSION: ICD-10-CM

## 2021-11-15 RX ORDER — ATORVASTATIN CALCIUM 20 MG/1
20 TABLET, FILM COATED ORAL NIGHTLY
Qty: 90 TABLET | Refills: 0 | Status: SHIPPED | OUTPATIENT
Start: 2021-11-15 | End: 2022-09-09 | Stop reason: SINTOL

## 2022-01-19 DIAGNOSIS — I10 ESSENTIAL HYPERTENSION: ICD-10-CM

## 2022-07-15 DIAGNOSIS — I10 ESSENTIAL HYPERTENSION: ICD-10-CM

## 2022-09-09 ENCOUNTER — OFFICE VISIT (OUTPATIENT)
Dept: FAMILY MEDICINE CLINIC | Facility: CLINIC | Age: 42
End: 2022-09-09

## 2022-09-09 ENCOUNTER — LAB (OUTPATIENT)
Dept: LAB | Facility: OTHER | Age: 42
End: 2022-09-09

## 2022-09-09 VITALS
SYSTOLIC BLOOD PRESSURE: 112 MMHG | OXYGEN SATURATION: 99 % | HEART RATE: 63 BPM | BODY MASS INDEX: 23.34 KG/M2 | HEIGHT: 68 IN | WEIGHT: 154 LBS | DIASTOLIC BLOOD PRESSURE: 84 MMHG

## 2022-09-09 DIAGNOSIS — I10 ESSENTIAL HYPERTENSION: ICD-10-CM

## 2022-09-09 DIAGNOSIS — I10 ESSENTIAL HYPERTENSION: Primary | ICD-10-CM

## 2022-09-09 DIAGNOSIS — H69.82 EUSTACHIAN TUBE DYSFUNCTION, LEFT: ICD-10-CM

## 2022-09-09 LAB
ALBUMIN SERPL-MCNC: 4.6 G/DL (ref 3.5–5)
ALBUMIN/GLOB SERPL: 1.4 G/DL (ref 1.1–1.8)
ALP SERPL-CCNC: 122 U/L (ref 38–126)
ALT SERPL W P-5'-P-CCNC: 32 U/L
ANION GAP SERPL CALCULATED.3IONS-SCNC: 8 MMOL/L (ref 5–15)
AST SERPL-CCNC: 31 U/L (ref 17–59)
BASOPHILS # BLD AUTO: 0.06 10*3/MM3 (ref 0–0.2)
BASOPHILS NFR BLD AUTO: 0.8 % (ref 0–1.5)
BILIRUB SERPL-MCNC: 0.7 MG/DL (ref 0.2–1.3)
BILIRUB UR QL STRIP: NEGATIVE
BUN SERPL-MCNC: 17 MG/DL (ref 7–23)
BUN/CREAT SERPL: 15.2 (ref 7–25)
CALCIUM SPEC-SCNC: 8.8 MG/DL (ref 8.4–10.2)
CHLORIDE SERPL-SCNC: 103 MMOL/L (ref 101–112)
CHOLEST SERPL-MCNC: 229 MG/DL (ref 150–200)
CLARITY UR: CLEAR
CO2 SERPL-SCNC: 29 MMOL/L (ref 22–30)
COLOR UR: YELLOW
CREAT SERPL-MCNC: 1.12 MG/DL (ref 0.7–1.3)
DEPRECATED RDW RBC AUTO: 43.2 FL (ref 37–54)
EGFRCR SERPLBLD CKD-EPI 2021: 84.1 ML/MIN/1.73
EOSINOPHIL # BLD AUTO: 0.58 10*3/MM3 (ref 0–0.4)
EOSINOPHIL NFR BLD AUTO: 7.7 % (ref 0.3–6.2)
ERYTHROCYTE [DISTWIDTH] IN BLOOD BY AUTOMATED COUNT: 13.5 % (ref 12.3–15.4)
GLOBULIN UR ELPH-MCNC: 3.2 GM/DL (ref 2.3–3.5)
GLUCOSE SERPL-MCNC: 93 MG/DL (ref 70–99)
GLUCOSE UR STRIP-MCNC: NEGATIVE MG/DL
HCT VFR BLD AUTO: 48.7 % (ref 37.5–51)
HDLC SERPL-MCNC: 39 MG/DL (ref 40–59)
HGB BLD-MCNC: 16.5 G/DL (ref 13–17.7)
HGB UR QL STRIP.AUTO: NEGATIVE
KETONES UR QL STRIP: NEGATIVE
LDLC SERPL CALC-MCNC: 149 MG/DL
LDLC/HDLC SERPL: 3.71 {RATIO} (ref 0–3.55)
LEUKOCYTE ESTERASE UR QL STRIP.AUTO: NEGATIVE
LYMPHOCYTES # BLD AUTO: 1.77 10*3/MM3 (ref 0.7–3.1)
LYMPHOCYTES NFR BLD AUTO: 23.5 % (ref 19.6–45.3)
MCH RBC QN AUTO: 29.8 PG (ref 26.6–33)
MCHC RBC AUTO-ENTMCNC: 33.9 G/DL (ref 31.5–35.7)
MCV RBC AUTO: 87.9 FL (ref 79–97)
MONOCYTES # BLD AUTO: 0.86 10*3/MM3 (ref 0.1–0.9)
MONOCYTES NFR BLD AUTO: 11.4 % (ref 5–12)
NEUTROPHILS NFR BLD AUTO: 4.25 10*3/MM3 (ref 1.7–7)
NEUTROPHILS NFR BLD AUTO: 56.6 % (ref 42.7–76)
NITRITE UR QL STRIP: NEGATIVE
PH UR STRIP.AUTO: 5.5 [PH] (ref 5.5–8)
PLATELET # BLD AUTO: 261 10*3/MM3 (ref 140–450)
PMV BLD AUTO: 9.4 FL (ref 6–12)
POTASSIUM SERPL-SCNC: 4.4 MMOL/L (ref 3.4–5)
PROT SERPL-MCNC: 7.8 G/DL (ref 6.3–8.6)
PROT UR QL STRIP: NEGATIVE
RBC # BLD AUTO: 5.54 10*6/MM3 (ref 4.14–5.8)
SODIUM SERPL-SCNC: 140 MMOL/L (ref 137–145)
SP GR UR STRIP: >=1.03 (ref 1–1.03)
TRIGL SERPL-MCNC: 226 MG/DL
UROBILINOGEN UR QL STRIP: NORMAL
VLDLC SERPL-MCNC: 41 MG/DL (ref 5–40)
WBC NRBC COR # BLD: 7.52 10*3/MM3 (ref 3.4–10.8)

## 2022-09-09 PROCEDURE — 99213 OFFICE O/P EST LOW 20 MIN: CPT | Performed by: NURSE PRACTITIONER

## 2022-09-09 PROCEDURE — 80053 COMPREHEN METABOLIC PANEL: CPT | Performed by: NURSE PRACTITIONER

## 2022-09-09 PROCEDURE — 85025 COMPLETE CBC W/AUTO DIFF WBC: CPT | Performed by: NURSE PRACTITIONER

## 2022-09-09 PROCEDURE — 81003 URINALYSIS AUTO W/O SCOPE: CPT | Performed by: NURSE PRACTITIONER

## 2022-09-09 PROCEDURE — 36415 COLL VENOUS BLD VENIPUNCTURE: CPT | Performed by: NURSE PRACTITIONER

## 2022-09-09 PROCEDURE — 84439 ASSAY OF FREE THYROXINE: CPT | Performed by: NURSE PRACTITIONER

## 2022-09-09 PROCEDURE — 80061 LIPID PANEL: CPT | Performed by: NURSE PRACTITIONER

## 2022-09-09 RX ORDER — METOPROLOL SUCCINATE 25 MG/1
25 TABLET, EXTENDED RELEASE ORAL DAILY
Qty: 90 TABLET | Refills: 1 | Status: SHIPPED | OUTPATIENT
Start: 2022-09-09 | End: 2023-03-10

## 2022-09-09 RX ORDER — PREDNISONE 10 MG/1
10 TABLET ORAL DAILY
Qty: 5 TABLET | Refills: 0 | Status: SHIPPED | OUTPATIENT
Start: 2022-09-09

## 2022-09-09 NOTE — PROGRESS NOTES
Subjective   Alpesh Small is a 42 y.o. male who presents to the office complaining of LEFT earache that radiates up in to his head and down in to his neck over the past couple of months he told my nurse.  When I questioned him about it in the exam room he tells me he's had this issue for years.  Has tried Flonase without relief.  Also requesting Metoprolol switched to extended release, tells me that he ran out of his and took some of his wifes that is extended release and tells me that he felt better after he took hers.  History of Present Illness     The following portions of the patient's history were reviewed and updated as appropriate: allergies, current medications, past family history, past medical history, past social history, past surgical history and problem list.    Review of Systems   Constitutional: Negative for chills, fatigue and fever.   HENT: Positive for ear pain. Negative for congestion, sneezing, sore throat and trouble swallowing.    Eyes: Negative for visual disturbance.   Respiratory: Negative for cough, chest tightness, shortness of breath and wheezing.    Cardiovascular: Negative for chest pain, palpitations and leg swelling.   Gastrointestinal: Negative for abdominal pain, constipation, diarrhea, nausea and vomiting.   Genitourinary: Negative for dysuria, frequency and urgency.   Musculoskeletal: Negative for neck pain.   Skin: Negative for rash.   Neurological: Negative for dizziness, weakness and headaches.   Psychiatric/Behavioral:        In the past two weeks the pt has not felt down, depressed, hopeless or lost interest in doing things   All other systems reviewed and are negative.      Objective   Physical Exam  Vitals and nursing note reviewed.   Constitutional:       Appearance: He is well-developed.   HENT:      Head: Normocephalic and atraumatic.      Right Ear: External ear normal.      Left Ear: External ear normal.      Nose: Nose normal.   Eyes:      General: No scleral  icterus.        Right eye: No discharge.         Left eye: No discharge.      Conjunctiva/sclera: Conjunctivae normal.      Pupils: Pupils are equal, round, and reactive to light.   Neck:      Thyroid: No thyromegaly.   Cardiovascular:      Rate and Rhythm: Normal rate and regular rhythm.      Heart sounds: Normal heart sounds. No murmur heard.    No friction rub. No gallop.   Pulmonary:      Effort: Pulmonary effort is normal. No respiratory distress.      Breath sounds: Normal breath sounds. No wheezing or rales.   Abdominal:      General: Bowel sounds are normal. There is no distension.      Palpations: Abdomen is soft.      Tenderness: There is no abdominal tenderness. There is no guarding or rebound.   Musculoskeletal:         General: Normal range of motion.      Cervical back: Normal range of motion and neck supple.   Lymphadenopathy:      Cervical: No cervical adenopathy.   Skin:     General: Skin is warm and dry.      Findings: No rash.   Neurological:      Mental Status: He is alert and oriented to person, place, and time.      Cranial Nerves: No cranial nerve deficit.   Psychiatric:         Behavior: Behavior normal.         Thought Content: Thought content normal.         Judgment: Judgment normal.         Assessment & Plan   Diagnoses and all orders for this visit:    1. Essential hypertension (Primary)  -     CBC & Differential; Future  -     Comprehensive Metabolic Panel; Future  -     T4, Free; Future  -     Triglycerides; Future  -     Urinalysis With Culture If Indicated -; Future  -     Lipid Panel; Future    2. Eustachian tube dysfunction, left  -     Ambulatory Referral to ENT (Otolaryngology)    Other orders  -     metoprolol succinate XL (Toprol XL) 25 MG 24 hr tablet; Take 1 tablet by mouth Daily.  Dispense: 90 tablet; Refill: 1  -     predniSONE (DELTASONE) 10 MG tablet; Take 1 tablet by mouth Daily.  Dispense: 5 tablet; Refill: 0    Is agreeable to taking Prednisone and we can get him set  up quick with ENT.  May use a heating pad to ear for pain relief prn.               This document has been electronically signed by VICTORIA Lund on September 11, 2022 20:51 CDT

## 2022-09-10 LAB — T4 FREE SERPL-MCNC: 0.84 NG/DL (ref 0.93–1.7)

## 2023-03-10 DIAGNOSIS — I10 ESSENTIAL HYPERTENSION: Primary | ICD-10-CM

## 2023-03-10 DIAGNOSIS — I10 ESSENTIAL HYPERTENSION: ICD-10-CM

## 2023-03-10 RX ORDER — METOPROLOL SUCCINATE 25 MG/1
25 TABLET, EXTENDED RELEASE ORAL DAILY
Qty: 90 TABLET | Refills: 1 | Status: SHIPPED | OUTPATIENT
Start: 2023-03-10

## 2023-03-10 RX ORDER — METOPROLOL SUCCINATE 25 MG/1
25 TABLET, EXTENDED RELEASE ORAL DAILY
Qty: 90 TABLET | Refills: 1 | Status: SHIPPED | OUTPATIENT
Start: 2023-03-10 | End: 2023-03-10 | Stop reason: SDUPTHER

## 2023-06-07 ENCOUNTER — TELEPHONE (OUTPATIENT)
Dept: FAMILY MEDICINE CLINIC | Facility: CLINIC | Age: 43
End: 2023-06-07
Payer: COMMERCIAL

## 2023-06-07 DIAGNOSIS — Z13.1 DIABETES MELLITUS SCREENING: ICD-10-CM

## 2023-06-07 DIAGNOSIS — I10 ESSENTIAL HYPERTENSION: Primary | ICD-10-CM

## 2023-06-12 ENCOUNTER — DOCUMENTATION (OUTPATIENT)
Dept: FAMILY MEDICINE CLINIC | Facility: CLINIC | Age: 43
End: 2023-06-12
Payer: COMMERCIAL

## 2023-06-12 NOTE — LETTER
June 12, 2023    Alpesh Small  [address] [facility] [address]    Subject: Overdue appointment and lab orders      Dear Mr. Small:    As your healthcare provider, we are committed to ensuring that you receive timely checkups and tests to keep you in good health. VICTORIA Lund ordered fasting labs for you. The orders are now past due. Please call the office to schedule an appointment and go to the lab at your earliest convenience to complete the pending lab orders.     Thank you for allowing us to take part in your healthcare. If you have any questions, please feel free to call us. [phone].      Sincerely,        Edith Rudd MA

## 2023-06-12 NOTE — PROGRESS NOTES
Patient is past due an appt and needs fasting labs. I am mailing him a letter today to contact the office to schedule.

## 2023-07-27 ENCOUNTER — OFFICE VISIT (OUTPATIENT)
Dept: GASTROENTEROLOGY | Facility: CLINIC | Age: 43
End: 2023-07-27
Payer: COMMERCIAL

## 2023-07-27 VITALS
OXYGEN SATURATION: 97 % | HEIGHT: 68 IN | DIASTOLIC BLOOD PRESSURE: 74 MMHG | BODY MASS INDEX: 23.49 KG/M2 | SYSTOLIC BLOOD PRESSURE: 110 MMHG | WEIGHT: 155 LBS | HEART RATE: 63 BPM

## 2023-07-27 DIAGNOSIS — R10.13 EPIGASTRIC PAIN: Primary | ICD-10-CM

## 2023-07-27 RX ORDER — DEXTROSE AND SODIUM CHLORIDE 5; .45 G/100ML; G/100ML
30 INJECTION, SOLUTION INTRAVENOUS CONTINUOUS PRN
OUTPATIENT
Start: 2023-07-27

## 2023-07-27 NOTE — H&P (VIEW-ONLY)
[facility]      Chief Complaint:   Chief Complaint   Patient presents with    Abdominal Pain       Subjective     HPI:   Patient with epigastric abdominal discomfort.  Patient states he also gets bloating patient was recently seen in the emergency room and source of this patient's abdominal pain was found.  Patient did have an EGD 4 years ago secondary to this epigastric abdominal pain but he had gone away for many years.  Patient states that he has been on a proton pump inhibitor in the past but they were too expensive.    Plan; we will schedule patient for EGD to evaluate cause of epigastric abdominal pain we will also evaluate for any strictures in the esophagus    Past Medical History:   Past Medical History:   Diagnosis Date    Acute nonintractable headache 08/10/2020    Congenital absence of one kidney     Constipation     Gastroesophageal reflux disease 09/19/2019    Hypertension 10/14/2019    SVT (supraventricular tachycardia) 12/2016       Past Surgical History:    Past Surgical History:   Procedure Laterality Date    APPENDECTOMY      COLONOSCOPY N/A 9/4/2019    Procedure: COLONOSCOPY;  Surgeon: Alpesh Jackson MD;  Location: [facility] ENDOSCOPY;  Service: Gastroenterology    ENDOSCOPY N/A 9/4/2019    Procedure: ESOPHAGOGASTRODUODENOSCOPY;  Surgeon: Alpesh Jackson MD;  Location: [facility] ENDOSCOPY;  Service: Gastroenterology    UPPER GASTROINTESTINAL ENDOSCOPY  09/04/2019       Family History:  Family History   Problem Relation Age of Onset    Lung cancer Father     Heart attack Father     Stroke Father     Diabetes Father     Hypertension Father     Hypertension Mother        Social History:   reports that he quit smoking about 3 years ago. His smoking use included electronic cigarette and cigarettes. He has never used smokeless tobacco. He reports current alcohol use. He reports that he does not use drugs.    Medications:   Prior to Admission  "medications    Medication Sig Start Date End Date Taking? Authorizing Provider   aspirin 81 MG chewable tablet Chew 1 tablet Daily.   Yes Jake Hill MD   atorvastatin (LIPITOR) 10 MG tablet Take 1 tablet by mouth Daily. 6/26/23  Yes Lucy Segura APRN   baclofen (LIORESAL) 10 MG tablet Take 1 tablet by mouth 3 (Three) Times a Day As Needed for Muscle Spasms. 6/23/23  Yes Lucy Segura APRN   Magnesium Hydroxide (DULCOLAX PO) Take  by mouth 1 (One) Time Per Week.   Yes Jake Hill MD   metoprolol succinate XL (TOPROL-XL) 25 MG 24 hr tablet Take 1 tablet by mouth Daily. 6/23/23  Yes Lucy Segura APRN   Polyethylene Glycol 3350 (MIRALAX PO) Take  by mouth.   Yes Jake Hill MD       Allergies:  Codeine and Penicillins    ROS:    Review of Systems   Constitutional:  Negative for activity change, appetite change and unexpected weight change.   HENT:  Positive for trouble swallowing. Negative for congestion and sore throat.    Respiratory:  Negative for cough, choking and shortness of breath.    Cardiovascular:  Negative for chest pain.   Gastrointestinal:  Negative for abdominal distention, abdominal pain, anal bleeding, blood in stool, constipation, diarrhea, nausea, rectal pain and vomiting.   Endocrine: Negative for heat intolerance, polydipsia and polyphagia.   Genitourinary:  Negative for difficulty urinating.   Musculoskeletal:  Negative for arthralgias.   Skin:  Negative for color change, pallor, rash and wound.   Allergic/Immunologic: Negative for food allergies.   Neurological:  Negative for dizziness, syncope, weakness and headaches.   Psychiatric/Behavioral:  Negative for agitation, behavioral problems, confusion and decreased concentration.    Objective     Blood pressure 110/74, pulse 63, height 172.7 cm (68\"), weight 70.3 kg (155 lb), SpO2 97 %.    Physical Exam  Constitutional:       General: He is not in acute distress.     Appearance: He is " well-developed. He is not diaphoretic.   HENT:      Head: Normocephalic and atraumatic.   Cardiovascular:      Rate and Rhythm: Normal rate and regular rhythm.      Heart sounds: Normal heart sounds. No murmur heard.    No friction rub. No gallop.   Pulmonary:      Effort: No respiratory distress.      Breath sounds: Normal breath sounds. No wheezing or rales.   Chest:      Chest wall: No tenderness.   Abdominal:      General: Bowel sounds are normal. There is no distension.      Palpations: Abdomen is soft. There is no mass.      Tenderness: There is no abdominal tenderness. There is no guarding or rebound.      Hernia: No hernia is present.   Musculoskeletal:         General: Normal range of motion.   Skin:     General: Skin is warm and dry.      Coloration: Skin is not pale.      Findings: No erythema or rash.   Neurological:      Mental Status: He is alert and oriented to person, place, and time.   Psychiatric:         Behavior: Behavior normal.         Thought Content: Thought content normal.         Judgment: Judgment normal.        Assessment & Plan   Diagnoses and all orders for this visit:    1. Epigastric pain (Primary)  -     Case Request; Standing  -     dextrose 5 % and sodium chloride 0.45 % infusion  -     Case Request    Other orders  -     Implement Anesthesia Orders Day of Procedure; Standing  -     Obtain Informed Consent; Standing  -     Follow Anesthesia Guidelines / Protocol; Future  -     Obtain Informed Consent; Future  -     POC Glucose Once; Standing  -     Insert Peripheral IV; Standing        ESOPHAGOGASTRODUODENOSCOPY (N/A)     Diagnosis Plan   1. Epigastric pain  Case Request    dextrose 5 % and sodium chloride 0.45 % infusion    Case Request          Anticipated Surgical Procedure:  Orders Placed This Encounter   Procedures    Obtain Informed Consent     Standing Status:   Future     Order Specific Question:   Informed Consent Given For     Answer:   ESOPHAGOGASTRODUODENOSCOPY        The risks, benefits, and alternatives of this procedure have been discussed with the patient or the responsible party- the patient understands and agrees to proceed.

## 2023-07-27 NOTE — PROGRESS NOTES
[facility]      Chief Complaint:   Chief Complaint   Patient presents with   • Abdominal Pain       Subjective     HPI:   Patient with epigastric abdominal discomfort.  Patient states he also gets bloating patient was recently seen in the emergency room and source of this patient's abdominal pain was found.  Patient did have an EGD 4 years ago secondary to this epigastric abdominal pain but he had gone away for many years.  Patient states that he has been on a proton pump inhibitor in the past but they were too expensive.    Plan; we will schedule patient for EGD to evaluate cause of epigastric abdominal pain we will also evaluate for any strictures in the esophagus    Past Medical History:   Past Medical History:   Diagnosis Date   • Acute nonintractable headache 08/10/2020   • Congenital absence of one kidney    • Constipation    • Gastroesophageal reflux disease 09/19/2019   • Hypertension 10/14/2019   • SVT (supraventricular tachycardia) 12/2016       Past Surgical History:    Past Surgical History:   Procedure Laterality Date   • APPENDECTOMY     • COLONOSCOPY N/A 9/4/2019    Procedure: COLONOSCOPY;  Surgeon: Alpesh Jackson MD;  Location: [facility] ENDOSCOPY;  Service: Gastroenterology   • ENDOSCOPY N/A 9/4/2019    Procedure: ESOPHAGOGASTRODUODENOSCOPY;  Surgeon: Alpesh Jackson MD;  Location: [facility] ENDOSCOPY;  Service: Gastroenterology   • UPPER GASTROINTESTINAL ENDOSCOPY  09/04/2019       Family History:  Family History   Problem Relation Age of Onset   • Lung cancer Father    • Heart attack Father    • Stroke Father    • Diabetes Father    • Hypertension Father    • Hypertension Mother        Social History:   reports that he quit smoking about 3 years ago. His smoking use included electronic cigarette and cigarettes. He has never used smokeless tobacco. He reports current alcohol use. He reports that he does not use drugs.    Medications:   Prior to Admission  "medications    Medication Sig Start Date End Date Taking? Authorizing Provider   aspirin 81 MG chewable tablet Chew 1 tablet Daily.   Yes Jake Hill MD   atorvastatin (LIPITOR) 10 MG tablet Take 1 tablet by mouth Daily. 6/26/23  Yes Lucy Segura APRN   baclofen (LIORESAL) 10 MG tablet Take 1 tablet by mouth 3 (Three) Times a Day As Needed for Muscle Spasms. 6/23/23  Yes Lucy Segura APRN   Magnesium Hydroxide (DULCOLAX PO) Take  by mouth 1 (One) Time Per Week.   Yes Jake Hill MD   metoprolol succinate XL (TOPROL-XL) 25 MG 24 hr tablet Take 1 tablet by mouth Daily. 6/23/23  Yes Lucy Segura APRN   Polyethylene Glycol 3350 (MIRALAX PO) Take  by mouth.   Yes Jake Hill MD       Allergies:  Codeine and Penicillins    ROS:    Review of Systems   Constitutional:  Negative for activity change, appetite change and unexpected weight change.   HENT:  Positive for trouble swallowing. Negative for congestion and sore throat.    Respiratory:  Negative for cough, choking and shortness of breath.    Cardiovascular:  Negative for chest pain.   Gastrointestinal:  Negative for abdominal distention, abdominal pain, anal bleeding, blood in stool, constipation, diarrhea, nausea, rectal pain and vomiting.   Endocrine: Negative for heat intolerance, polydipsia and polyphagia.   Genitourinary:  Negative for difficulty urinating.   Musculoskeletal:  Negative for arthralgias.   Skin:  Negative for color change, pallor, rash and wound.   Allergic/Immunologic: Negative for food allergies.   Neurological:  Negative for dizziness, syncope, weakness and headaches.   Psychiatric/Behavioral:  Negative for agitation, behavioral problems, confusion and decreased concentration.    Objective     Blood pressure 110/74, pulse 63, height 172.7 cm (68\"), weight 70.3 kg (155 lb), SpO2 97 %.    Physical Exam  Constitutional:       General: He is not in acute distress.     Appearance: He is " well-developed. He is not diaphoretic.   HENT:      Head: Normocephalic and atraumatic.   Cardiovascular:      Rate and Rhythm: Normal rate and regular rhythm.      Heart sounds: Normal heart sounds. No murmur heard.    No friction rub. No gallop.   Pulmonary:      Effort: No respiratory distress.      Breath sounds: Normal breath sounds. No wheezing or rales.   Chest:      Chest wall: No tenderness.   Abdominal:      General: Bowel sounds are normal. There is no distension.      Palpations: Abdomen is soft. There is no mass.      Tenderness: There is no abdominal tenderness. There is no guarding or rebound.      Hernia: No hernia is present.   Musculoskeletal:         General: Normal range of motion.   Skin:     General: Skin is warm and dry.      Coloration: Skin is not pale.      Findings: No erythema or rash.   Neurological:      Mental Status: He is alert and oriented to person, place, and time.   Psychiatric:         Behavior: Behavior normal.         Thought Content: Thought content normal.         Judgment: Judgment normal.        Assessment & Plan   Diagnoses and all orders for this visit:    1. Epigastric pain (Primary)  -     Case Request; Standing  -     dextrose 5 % and sodium chloride 0.45 % infusion  -     Case Request    Other orders  -     Implement Anesthesia Orders Day of Procedure; Standing  -     Obtain Informed Consent; Standing  -     Follow Anesthesia Guidelines / Protocol; Future  -     Obtain Informed Consent; Future  -     POC Glucose Once; Standing  -     Insert Peripheral IV; Standing        ESOPHAGOGASTRODUODENOSCOPY (N/A)     Diagnosis Plan   1. Epigastric pain  Case Request    dextrose 5 % and sodium chloride 0.45 % infusion    Case Request          Anticipated Surgical Procedure:  Orders Placed This Encounter   Procedures   • Obtain Informed Consent     Standing Status:   Future     Order Specific Question:   Informed Consent Given For     Answer:   ESOPHAGOGASTRODUODENOSCOPY        The risks, benefits, and alternatives of this procedure have been discussed with the patient or the responsible party- the patient understands and agrees to proceed.

## 2023-08-11 ENCOUNTER — OFFICE VISIT (OUTPATIENT)
Dept: CARDIOLOGY | Facility: CLINIC | Age: 43
End: 2023-08-11
Payer: COMMERCIAL

## 2023-08-11 VITALS
DIASTOLIC BLOOD PRESSURE: 68 MMHG | HEART RATE: 62 BPM | BODY MASS INDEX: 23.34 KG/M2 | OXYGEN SATURATION: 97 % | HEIGHT: 68 IN | SYSTOLIC BLOOD PRESSURE: 128 MMHG | WEIGHT: 154 LBS | TEMPERATURE: 97.1 F

## 2023-08-11 DIAGNOSIS — E78.2 HYPERLIPEMIA, MIXED: ICD-10-CM

## 2023-08-11 DIAGNOSIS — R00.2 PALPITATIONS: ICD-10-CM

## 2023-08-11 DIAGNOSIS — I10 HYPERTENSION, UNSPECIFIED TYPE: Primary | ICD-10-CM

## 2023-08-11 DIAGNOSIS — I47.1 PAROXYSMAL SVT (SUPRAVENTRICULAR TACHYCARDIA): ICD-10-CM

## 2023-08-11 NOTE — PROGRESS NOTES
Alpesh Small  43 y.o. male    08/11/2023  1. Hypertension, unspecified type    2. Paroxysmal SVT (supraventricular tachycardia)    3. Hyperlipemia, mixed    4. Palpitations        History of Present Illness  Alpesh Small is a 43-year-old male who is being seen by me for the first time as referred by VICTORIA Rodrigues.  He has seen Dr. Mcguire in [place] in the past.  His history is remarkable for longstanding history of palpitation and documented supraventricular tachycardia in the past.  He apparently has had fluttering in the chest since age of 16 years and has had on and off fluttering in the chest.  In December 2016 with palpitation and noted to have heart rate greater than 200 bpm and he responded to adenosine with conversion to sinus rhythm.  His troponins were borderline elevated during the episode and a diagnosis of NSTEMI was made.  This is most likely secondary to his increased heart rate/demand ischemia.  He was recommended EP studies/ablation but he opted to go with medical management.  Patient did admit to drinking excessive amounts of caffeine/caffeinated drinks back then and use cut back on that.  He is currently a non-smoker and does not drink alcohol.  There is no drug use at the present time.    He has had intermittent skipped beats/fluttering in the chest from time to time and he is more aware of this issue lately.  He denies any rapid heart rate like what he experienced back in 2016.  He denies any chest pain or shortness of breath at this time.  His heart rate and blood pressure were in the normal range.  He has been compliant with metoprolol succinate.  His LDL was noted to be elevated at 162 on 6/23/2023 with an HDL of 35 and triglyceride of 158 and he is now on atorvastatin 10 mg daily.    CT angiogram of the coronary arteries in December 2016 showed calcium score of 0 and no epicardial coronary artery disease.  Myocardial bridging was noted.    EKG today showed sinus rhythm with heart  yes "rate of 62 bpm.  No ST-T wave changes suggestive of ischemia.    SUBJECTIVE    Allergies   Allergen Reactions    Codeine Hives    Penicillins Hives         Past Medical History:   Diagnosis Date    Acute nonintractable headache 08/10/2020    Congenital absence of one kidney     Constipation     Gastroesophageal reflux disease 09/19/2019    Hyperlipemia, mixed 8/11/2023    Hypertension 10/14/2019    SVT (supraventricular tachycardia) 12/2016         Past Surgical History:   Procedure Laterality Date    APPENDECTOMY      COLONOSCOPY N/A 9/4/2019    Procedure: COLONOSCOPY;  Surgeon: Alpesh Jackson MD;  Location: [facility] ENDOSCOPY;  Service: Gastroenterology    ENDOSCOPY N/A 9/4/2019    Procedure: ESOPHAGOGASTRODUODENOSCOPY;  Surgeon: Alpesh Jackson MD;  Location: [facility] ENDOSCOPY;  Service: Gastroenterology    UPPER GASTROINTESTINAL ENDOSCOPY  09/04/2019         Family History   Problem Relation Age of Onset    Lung cancer Father     Heart attack Father     Stroke Father     Diabetes Father     Hypertension Father     Hypertension Mother          Social History     Socioeconomic History    Marital status:      Spouse name: Beulah    Number of children: 1   Tobacco Use    Smoking status: Former     Years: 10.00     Types: Electronic Cigarette, Cigarettes     Quit date: 2020     Years since quitting: 3.6    Smokeless tobacco: Never   Substance and Sexual Activity    Alcohol use: Yes     Comment: 08/29/2019 - Patinet states he consumes an alcoholic beverage \"every now and then\".    Drug use: No     Types: Marijuana, Methamphetamines     Comment: 08/29/2019 - Patient states he has a past history of Marijuana and Methamphetamines.    Sexual activity: Defer         Current Outpatient Medications   Medication Sig Dispense Refill    aspirin 81 MG chewable tablet Chew 1 tablet Daily.      atorvastatin (LIPITOR) 10 MG tablet Take 1 tablet by mouth Daily. 90 tablet 3    baclofen (LIORESAL) 10 MG tablet Take 1 tablet by " "mouth 3 (Three) Times a Day As Needed for Muscle Spasms. 90 tablet 3    Magnesium Hydroxide (DULCOLAX PO) Take  by mouth 1 (One) Time Per Week.      metoprolol succinate XL (TOPROL-XL) 25 MG 24 hr tablet Take 1 tablet by mouth Daily. 90 tablet 3    Polyethylene Glycol 3350 (MIRALAX PO) Take  by mouth.       No current facility-administered medications for this visit.         OBJECTIVE    /68 (BP Location: Left arm, Patient Position: Sitting, Cuff Size: Adult)   Pulse 62   Temp 97.1 øF (36.2 øC)   Ht 172.7 cm (68\")   Wt 69.9 kg (154 lb)   SpO2 97%   BMI 23.42 kg/mý         Review of Systems     Constitutional:  Denies recent weight loss, weight gain, fever or chills, no change in exercise tolerance     HENT:  Denies any hearing loss, epistaxis, hoarseness, or difficulty speaking.     Eyes: Wears eyeglasses or contact lenses     Respiratory:  Denies dyspnea with exertion, no cough, wheezing, or hemoptysis.     Cardiovascular: See HPI.  History of SVT, fluttering/skipped beats.    Gastrointestinal:  Denies change in bowel habits, dyspepsia, ulcer disease, hematochezia, or melena.     Endocrine: Negative for cold intolerance, heat intolerance, polydipsia, polyphagia and polyuria.  Hyperlipidemia    Genitourinary: Negative.      Musculoskeletal: Denies any history of arthritic symptoms or back problems     Skin:  Denies any change in hair or nails, rashes, or skin lesions.     Allergic/Immunologic: Negative.  Negative for environmental allergies, food allergies and/or immunocompromised state.     Neurological:  Denies any history of recurrent headaches, strokes, TIA, or seizure disorder.     Hematological: Denies any food allergies, seasonal allergies, bleeding disorders, or lymphadenopathy.     Psychiatric/Behavioral: Denies any history of depression, substance abuse, or change in cognitive function.         Physical Exam     Constitutional: Cooperative, alert and oriented, well-developed, well-nourished, in " no acute distress.  BMI 23.4    HENT:   Head: Normocephalic, normal hair patterns, no masses or tenderness.  Ears, Nose, and Throat: No gross abnormalities. No pallor or cyanosis. Dentition good.   Eyes: EOMS intact, PERRL, conjunctivae and lids unremarkable. Fundoscopic exam and visual fields not performed.   Neck: No palpable masses or adenopathy, no thyromegaly, no JVD, carotid pulses are full and equal bilaterally and without bruit.     Cardiovascular: Regular rhythm, S1 and S2 normal, no S3 or S4.  No murmurs, gallops, or rubs detected.     Pulmonary/Chest: Chest: normal symmetry, normal respiratory excursion, no intercostal retraction, no use of accessory muscles.     Pulmonary: Normal breath sounds. No rales or rhonchi.    Abdominal: Abdomen soft, bowel sounds normoactive, no masses, no hepatosplenomegaly, nontender, no bruit.     Musculoskeletal: No deformities, clubbing, cyanosis, erythema, or edema observed. There are no spinal abnormalities noted. Normal muscle strength and tone. Pulses full and equal in all extremities, no bruit auscultated.     Neurological: No gross motor or sensory deficits noted, affect appropriate, oriented to time, person, place.     Skin: Warm and dry to the touch, no apparent skin lesion or mass noted.     Psychiatric: He has a normal mood and affect. His behavior is normal. Judgment and thought content normal.         Procedures      Lab Results   Component Value Date    WBC 8.56 06/27/2023    HGB 16.5 06/27/2023    HCT 48.0 06/27/2023    MCV 87.1 06/27/2023     06/27/2023     Lab Results   Component Value Date    GLUCOSE 96 06/27/2023    BUN 15 06/27/2023    CREATININE 0.95 06/27/2023    EGFRIFNONA 80 11/11/2020    BCR 15.8 06/27/2023    CO2 27.0 06/27/2023    CALCIUM 9.2 06/27/2023    ALBUMIN 4.4 06/27/2023    AST 19 06/27/2023    ALT 21 06/27/2023     Lab Results   Component Value Date    CHOL 226 (H) 06/23/2023    CHOL 229 (H) 09/09/2022    CHOL 233 (H) 11/11/2020      Lab Results   Component Value Date    TRIG 158 (H) 06/23/2023    TRIG 226 (H) 09/09/2022    TRIG 236 (H) 11/11/2020     Lab Results   Component Value Date    HDL 35 (L) 06/23/2023    HDL 39 (L) 09/09/2022    HDL 37 (L) 11/11/2020     No components found for: LDLCALC  Lab Results   Component Value Date     (H) 06/23/2023     (H) 09/09/2022     (H) 11/11/2020     No results found for: HDLLDLRATIO  No components found for: CHOLHDL  Lab Results   Component Value Date    HGBA1C 5.50 06/23/2023     Lab Results   Component Value Date    TSH 1.620 06/23/2023    H7ESXVD 9.5 12/19/2016           ASSESSMENT AND PLAN  Alpesh Small is a 43-year-old male with history of documented paroxysmal SVT in the past on beta-blockers, history of hypertension, hyperlipidemia, who is presented with intermittent fluttering in the chest.  Though PACs are likely, to rule out sustained arrhythmias, an event monitor for 2 weeks is being arranged.  An echocardiogram to assess left ventricular and valvular function and rule out structural heart disease is being arranged.    I did discuss EP study/ablation if he has recurrence of SVT and pros and cons have been discussed.  He has read about the potential complications and has been worried about it.  I did explain the risks and benefits.  He wishes to continue his medications for now and optimization of LDL recommended.  Antiplatelet therapy with aspirin has been continued.  Further recommendations will follow.  Thank you for asked me to see this patient.    Diagnoses and all orders for this visit:    1. Hypertension, unspecified type (Primary)  -     ECG 12 Lead  -     Mobile Cardiac Outpatient Telemetry; Future  -     Adult Transthoracic Echo Complete w/ Color, Spectral and Contrast if Necessary Per Protocol; Future    2. Paroxysmal SVT (supraventricular tachycardia)  -     Mobile Cardiac Outpatient Telemetry; Future  -     Adult Transthoracic Echo Complete w/ Color, Spectral  and Contrast if Necessary Per Protocol; Future    3. Hyperlipemia, mixed  -     Mobile Cardiac Outpatient Telemetry; Future  -     Adult Transthoracic Echo Complete w/ Color, Spectral and Contrast if Necessary Per Protocol; Future    4. Palpitations  -     Mobile Cardiac Outpatient Telemetry; Future  -     Adult Transthoracic Echo Complete w/ Color, Spectral and Contrast if Necessary Per Protocol; Future        BMI is within normal parameters. No other follow-up for BMI required.      Alpesh Hernandez Geovanny  reports that he quit smoking about 3 years ago. His smoking use included electronic cigarette and cigarettes. He has never used smokeless tobacco..         Moshe Mancia MD  8/11/2023  08:36 CDT   Active at work

## 2023-08-12 LAB
QT INTERVAL: 388 MS
QTC INTERVAL: 393 MS

## 2023-08-18 ENCOUNTER — ANESTHESIA (OUTPATIENT)
Dept: GASTROENTEROLOGY | Facility: HOSPITAL | Age: 43
End: 2023-08-18
Payer: COMMERCIAL

## 2023-08-18 ENCOUNTER — HOSPITAL ENCOUNTER (OUTPATIENT)
Facility: HOSPITAL | Age: 43
Setting detail: HOSPITAL OUTPATIENT SURGERY
Discharge: HOME | End: 2023-08-18
Attending: INTERNAL MEDICINE | Admitting: INTERNAL MEDICINE
Payer: COMMERCIAL

## 2023-08-18 ENCOUNTER — ANESTHESIA EVENT (OUTPATIENT)
Dept: GASTROENTEROLOGY | Facility: HOSPITAL | Age: 43
End: 2023-08-18
Payer: COMMERCIAL

## 2023-08-18 VITALS
BODY MASS INDEX: 23.19 KG/M2 | HEIGHT: 68 IN | TEMPERATURE: 97.6 F | DIASTOLIC BLOOD PRESSURE: 73 MMHG | SYSTOLIC BLOOD PRESSURE: 112 MMHG | HEART RATE: 70 BPM | RESPIRATION RATE: 16 BRPM | OXYGEN SATURATION: 97 % | WEIGHT: 153 LBS

## 2023-08-18 DIAGNOSIS — R10.13 EPIGASTRIC PAIN: ICD-10-CM

## 2023-08-18 PROCEDURE — 43239 EGD BIOPSY SINGLE/MULTIPLE: CPT | Performed by: INTERNAL MEDICINE

## 2023-08-18 PROCEDURE — 25010000002 PROPOFOL 10 MG/ML EMULSION

## 2023-08-18 PROCEDURE — C1769 GUIDE WIRE: HCPCS | Performed by: INTERNAL MEDICINE

## 2023-08-18 PROCEDURE — 43248 EGD GUIDE WIRE INSERTION: CPT | Performed by: INTERNAL MEDICINE

## 2023-08-18 RX ORDER — PROPOFOL 10 MG/ML
VIAL (ML) INTRAVENOUS AS NEEDED
Status: DISCONTINUED | OUTPATIENT
Start: 2023-08-18 | End: 2023-08-18 | Stop reason: SURG

## 2023-08-18 RX ORDER — LIDOCAINE HYDROCHLORIDE 20 MG/ML
INJECTION, SOLUTION INTRAVENOUS AS NEEDED
Status: DISCONTINUED | OUTPATIENT
Start: 2023-08-18 | End: 2023-08-18 | Stop reason: SURG

## 2023-08-18 RX ORDER — DEXTROSE AND SODIUM CHLORIDE 5; .45 G/100ML; G/100ML
30 INJECTION, SOLUTION INTRAVENOUS CONTINUOUS PRN
Status: DISCONTINUED | OUTPATIENT
Start: 2023-08-18 | End: 2023-08-18 | Stop reason: HOSPADM

## 2023-08-18 RX ADMIN — LIDOCAINE HYDROCHLORIDE 100 MG: 20 INJECTION, SOLUTION INTRAVENOUS at 10:05

## 2023-08-18 RX ADMIN — PROPOFOL 70 MG: 10 INJECTION, EMULSION INTRAVENOUS at 10:05

## 2023-08-18 RX ADMIN — DEXTROSE AND SODIUM CHLORIDE 30 ML/HR: 5; 450 INJECTION, SOLUTION INTRAVENOUS at 08:39

## 2023-08-18 RX ADMIN — PROPOFOL 100 MG: 10 INJECTION, EMULSION INTRAVENOUS at 10:06

## 2023-08-18 NOTE — ANESTHESIA POSTPROCEDURE EVALUATION
Patient: Alpesh Small    Procedure Summary       Date: 08/18/23 Room / Location: [facility] ENDOSCOPY 1 / [facility]    Anesthesia Start: 1004 Anesthesia Stop: 1012    Procedure: ESOPHAGOGASTRODUODENOSCOPY Diagnosis:       Epigastric pain      (Epigastric pain [R10.13])    Surgeons: Alpesh Jackson MD Provider: Ceasar Moe CRNA    Anesthesia Type: general ASA Status: 3            Anesthesia Type: general    Vitals  No vitals data found for the desired time range.          Post Anesthesia Care and Evaluation    Patient location during evaluation: PHASE II  Patient participation: complete - patient cannot participate  Level of consciousness: sleepy but conscious  Pain score: 0  Pain management: adequate    Airway patency: patent  Anesthetic complications: No anesthetic complications  PONV Status: none  Cardiovascular status: acceptable  Respiratory status: acceptable  Hydration status: acceptable  No anesthesia care post op

## 2023-08-18 NOTE — ANESTHESIA PREPROCEDURE EVALUATION
Anesthesia Evaluation     NPO Solid Status: > 8 hours  NPO Liquid Status: > 8 hours           Airway   Mallampati: III  TM distance: >3 FB  Neck ROM: full  No difficulty expected  Dental    (+) poor dentition    Pulmonary - normal exam   Cardiovascular - normal exam    (+) hypertensionhyperlipidemia      Neuro/Psych  (+) headaches  GI/Hepatic/Renal/Endo    (+) GERD, renal disease (congenital one kidney)    Musculoskeletal     Abdominal    Substance History      OB/GYN          Other                        Anesthesia Plan    ASA 3     general   total IV anesthesia  intravenous induction     Anesthetic plan, risks, benefits, and alternatives have been provided, discussed and informed consent has been obtained with: patient.

## 2023-08-21 LAB — REF LAB TEST METHOD: NORMAL

## 2023-08-25 ENCOUNTER — OFFICE VISIT (OUTPATIENT)
Dept: GASTROENTEROLOGY | Facility: CLINIC | Age: 43
End: 2023-08-25
Payer: COMMERCIAL

## 2023-08-25 VITALS
SYSTOLIC BLOOD PRESSURE: 110 MMHG | HEART RATE: 68 BPM | HEIGHT: 68 IN | WEIGHT: 156.4 LBS | BODY MASS INDEX: 23.7 KG/M2 | DIASTOLIC BLOOD PRESSURE: 80 MMHG | OXYGEN SATURATION: 97 %

## 2023-08-25 DIAGNOSIS — R13.19 ESOPHAGEAL DYSPHAGIA: Primary | ICD-10-CM

## 2023-08-25 RX ORDER — CHOLECALCIFEROL (VITAMIN D3) 50 MCG
2000 TABLET ORAL DAILY
COMMUNITY

## 2023-08-25 RX ORDER — OMEPRAZOLE 20 MG/1
20 CAPSULE, DELAYED RELEASE ORAL DAILY
Qty: 30 CAPSULE | Refills: 5 | Status: SHIPPED | OUTPATIENT
Start: 2023-08-25

## 2023-08-25 NOTE — PROGRESS NOTES
[facility]      Chief Complaint:   Chief Complaint   Patient presents with    Follow-up     Endo results       Subjective     HPI:   Patient with dysphagia.  Patient had EGD with dilatation and biopsies patient states that he feels markedly better.    Plan; we will start patient on Prilosec 20 mg daily.  Patient to follow-up as needed for dysphagia.    Past Medical History:   Past Medical History:   Diagnosis Date    Acute nonintractable headache 08/10/2020    Congenital absence of one kidney     Constipation     Gastroesophageal reflux disease 09/19/2019    Hyperlipemia, mixed 8/11/2023    Hypertension 10/14/2019    SVT (supraventricular tachycardia) 12/2016       Past Surgical History:    Past Surgical History:   Procedure Laterality Date    APPENDECTOMY      COLONOSCOPY N/A 9/4/2019    Procedure: COLONOSCOPY;  Surgeon: Alpesh Jackson MD;  Location: [facility] ENDOSCOPY;  Service: Gastroenterology    ENDOSCOPY N/A 9/4/2019    Procedure: ESOPHAGOGASTRODUODENOSCOPY;  Surgeon: Alpesh Jackson MD;  Location: [facility] ENDOSCOPY;  Service: Gastroenterology    ENDOSCOPY N/A 8/18/2023    Procedure: ESOPHAGOGASTRODUODENOSCOPY;  Surgeon: Alpesh Jackson MD;  Location: [facility] ENDOSCOPY;  Service: Gastroenterology;  Laterality: N/A;    UPPER GASTROINTESTINAL ENDOSCOPY  09/04/2019       Family History:  Family History   Problem Relation Age of Onset    Lung cancer Father     Heart attack Father     Stroke Father     Diabetes Father     Hypertension Father     Hypertension Mother        Social History:   reports that he quit smoking about 3 years ago. His smoking use included electronic cigarette and cigarettes. He has never used smokeless tobacco. He reports current alcohol use. He reports that he does not currently use drugs after having used the following drugs: Marijuana and Methamphetamines.    Medications:   Prior to Admission medications    Medication Sig Start Date End Date  Taking? Authorizing Provider   aspirin 81 MG chewable tablet Chew 1 tablet Daily.   Yes Jake Hill MD   atorvastatin (LIPITOR) 10 MG tablet Take 1 tablet by mouth Daily. 6/26/23  Yes Lucy Segura APRN   baclofen (LIORESAL) 10 MG tablet Take 1 tablet by mouth 3 (Three) Times a Day As Needed for Muscle Spasms. 6/23/23  Yes Lucy Segura APRN   Cholecalciferol (Vitamin D) 50 MCG (2000 UT) tablet Take 1 tablet by mouth Daily.   Yes Jake Hill MD   Magnesium Hydroxide (DULCOLAX PO) Take  by mouth 1 (One) Time Per Week.   Yes Jake Hill MD   metoprolol succinate XL (TOPROL-XL) 25 MG 24 hr tablet Take 1 tablet by mouth Daily. 6/23/23  Yes Lucy Segura APRN   Polyethylene Glycol 3350 (MIRALAX PO) Take  by mouth.   Yes Jake Hill MD   omeprazole (priLOSEC) 20 MG capsule Take 1 capsule by mouth Daily. 8/25/23   Alpesh Jackson MD       Allergies:  Codeine and Penicillins    ROS:    Review of Systems   Constitutional:  Negative for activity change, appetite change and unexpected weight change.   HENT:  Negative for congestion, sore throat and trouble swallowing.    Respiratory:  Negative for cough, choking and shortness of breath.    Cardiovascular:  Negative for chest pain.   Gastrointestinal:  Negative for abdominal distention, abdominal pain, anal bleeding, blood in stool, constipation, diarrhea, nausea, rectal pain and vomiting.   Endocrine: Negative for heat intolerance, polydipsia and polyphagia.   Genitourinary:  Negative for difficulty urinating.   Musculoskeletal:  Negative for arthralgias.   Skin:  Negative for color change, pallor, rash and wound.   Allergic/Immunologic: Negative for food allergies.   Neurological:  Negative for dizziness, syncope, weakness and headaches.   Psychiatric/Behavioral:  Negative for agitation, behavioral problems, confusion and decreased concentration.    Objective     Blood pressure 110/80, pulse 68, height 172.7  "cm (68\"), weight 70.9 kg (156 lb 6.4 oz), SpO2 97 %.    Physical Exam  Constitutional:       General: He is not in acute distress.     Appearance: He is well-developed. He is not diaphoretic.   HENT:      Head: Normocephalic and atraumatic.   Cardiovascular:      Rate and Rhythm: Normal rate and regular rhythm.      Heart sounds: Normal heart sounds. No murmur heard.    No friction rub. No gallop.   Pulmonary:      Effort: No respiratory distress.      Breath sounds: Normal breath sounds. No wheezing or rales.   Chest:      Chest wall: No tenderness.   Abdominal:      General: Bowel sounds are normal. There is no distension.      Palpations: Abdomen is soft. There is no mass.      Tenderness: There is no abdominal tenderness. There is no guarding or rebound.      Hernia: No hernia is present.   Musculoskeletal:         General: Normal range of motion.   Skin:     General: Skin is warm and dry.      Coloration: Skin is not pale.      Findings: No erythema or rash.   Neurological:      Mental Status: He is alert and oriented to person, place, and time.   Psychiatric:         Behavior: Behavior normal.         Thought Content: Thought content normal.         Judgment: Judgment normal.        Assessment & Plan   Diagnoses and all orders for this visit:    1. Esophageal dysphagia (Primary)    Other orders  -     omeprazole (priLOSEC) 20 MG capsule; Take 1 capsule by mouth Daily.  Dispense: 30 capsule; Refill: 5        * Surgery not found *     Diagnosis Plan   1. Esophageal dysphagia            Anticipated Surgical Procedure:  No orders of the defined types were placed in this encounter.      The risks, benefits, and alternatives of this procedure have been discussed with the patient or the responsible party- the patient understands and agrees to proceed.                                                                "

## (undated) DEVICE — CLEANGUIDE DISPOSABLE MARKED SPRING TIP GUIDEWIRE, 1.86 MM X 210 CM: Brand: CLEANGUIDE

## (undated) DEVICE — SINGLE-USE BIOPSY FORCEPS: Brand: RADIAL JAW 4

## (undated) DEVICE — BITEBLOCK ENDO W/STRAP 60F A/ LF DISP